# Patient Record
Sex: FEMALE | Race: WHITE | NOT HISPANIC OR LATINO | Employment: OTHER | URBAN - METROPOLITAN AREA
[De-identification: names, ages, dates, MRNs, and addresses within clinical notes are randomized per-mention and may not be internally consistent; named-entity substitution may affect disease eponyms.]

---

## 2019-07-17 RX ORDER — LISINOPRIL 5 MG/1
TABLET ORAL
Qty: 30 TABLET | Refills: 5 | Status: SHIPPED | OUTPATIENT
Start: 2019-07-17 | End: 2020-01-12

## 2019-08-26 ENCOUNTER — OFFICE VISIT (OUTPATIENT)
Dept: FAMILY MEDICINE CLINIC | Facility: CLINIC | Age: 64
End: 2019-08-26

## 2019-08-26 VITALS
HEART RATE: 69 BPM | WEIGHT: 158 LBS | DIASTOLIC BLOOD PRESSURE: 82 MMHG | TEMPERATURE: 97 F | OXYGEN SATURATION: 100 % | SYSTOLIC BLOOD PRESSURE: 143 MMHG

## 2019-08-26 DIAGNOSIS — M19.90 ARTHRITIS: Primary | ICD-10-CM

## 2019-08-26 PROBLEM — K21.9 GASTROESOPHAGEAL REFLUX DISEASE: Status: ACTIVE | Noted: 2017-04-17

## 2019-08-26 PROBLEM — R73.01 FASTING HYPERGLYCEMIA: Status: ACTIVE | Noted: 2017-05-19

## 2019-08-26 PROCEDURE — 99213 OFFICE O/P EST LOW 20 MIN: CPT | Performed by: FAMILY MEDICINE

## 2019-08-26 RX ORDER — OMEPRAZOLE 20 MG/1
1 CAPSULE, DELAYED RELEASE ORAL DAILY PRN
COMMUNITY
Start: 2019-07-17 | End: 2020-07-17

## 2019-08-26 RX ORDER — ESTRADIOL 1 MG/1
1 TABLET ORAL DAILY
COMMUNITY
Start: 2016-10-04

## 2019-09-04 DIAGNOSIS — M19.90 ARTHRITIS: Primary | ICD-10-CM

## 2019-09-08 PROBLEM — M19.90 ARTHRITIS: Status: ACTIVE | Noted: 2019-09-08

## 2019-10-04 ENCOUNTER — OFFICE (AMBULATORY)
Dept: URBAN - METROPOLITAN AREA PATHOLOGY 4 | Facility: PATHOLOGY | Age: 64
End: 2019-10-04
Payer: COMMERCIAL

## 2019-10-04 ENCOUNTER — ON CAMPUS - OUTPATIENT (AMBULATORY)
Dept: URBAN - METROPOLITAN AREA HOSPITAL 2 | Facility: HOSPITAL | Age: 64
End: 2019-10-04
Payer: COMMERCIAL

## 2019-10-04 VITALS
DIASTOLIC BLOOD PRESSURE: 72 MMHG | WEIGHT: 155.4 LBS | SYSTOLIC BLOOD PRESSURE: 130 MMHG | RESPIRATION RATE: 12 BRPM | DIASTOLIC BLOOD PRESSURE: 76 MMHG | DIASTOLIC BLOOD PRESSURE: 63 MMHG | TEMPERATURE: 97.8 F | HEART RATE: 67 BPM | HEART RATE: 74 BPM | OXYGEN SATURATION: 97 % | HEIGHT: 64 IN | DIASTOLIC BLOOD PRESSURE: 86 MMHG | DIASTOLIC BLOOD PRESSURE: 77 MMHG | HEART RATE: 73 BPM | HEART RATE: 77 BPM | HEART RATE: 75 BPM | SYSTOLIC BLOOD PRESSURE: 101 MMHG | RESPIRATION RATE: 19 BRPM | SYSTOLIC BLOOD PRESSURE: 95 MMHG | HEART RATE: 80 BPM | OXYGEN SATURATION: 99 % | HEART RATE: 84 BPM | RESPIRATION RATE: 16 BRPM | OXYGEN SATURATION: 98 % | SYSTOLIC BLOOD PRESSURE: 121 MMHG | DIASTOLIC BLOOD PRESSURE: 78 MMHG | RESPIRATION RATE: 17 BRPM | HEART RATE: 66 BPM | SYSTOLIC BLOOD PRESSURE: 117 MMHG | HEART RATE: 81 BPM | DIASTOLIC BLOOD PRESSURE: 85 MMHG | SYSTOLIC BLOOD PRESSURE: 137 MMHG | SYSTOLIC BLOOD PRESSURE: 126 MMHG | DIASTOLIC BLOOD PRESSURE: 65 MMHG | SYSTOLIC BLOOD PRESSURE: 147 MMHG | DIASTOLIC BLOOD PRESSURE: 71 MMHG | DIASTOLIC BLOOD PRESSURE: 64 MMHG | OXYGEN SATURATION: 100 %

## 2019-10-04 DIAGNOSIS — D12.2 BENIGN NEOPLASM OF ASCENDING COLON: ICD-10-CM

## 2019-10-04 DIAGNOSIS — K63.5 POLYP OF COLON: ICD-10-CM

## 2019-10-04 DIAGNOSIS — Z12.11 ENCOUNTER FOR SCREENING FOR MALIGNANT NEOPLASM OF COLON: ICD-10-CM

## 2019-10-04 DIAGNOSIS — K57.30 DIVERTICULOSIS OF LARGE INTESTINE WITHOUT PERFORATION OR ABS: ICD-10-CM

## 2019-10-04 DIAGNOSIS — D12.3 BENIGN NEOPLASM OF TRANSVERSE COLON: ICD-10-CM

## 2019-10-04 DIAGNOSIS — K64.8 OTHER HEMORRHOIDS: ICD-10-CM

## 2019-10-04 PROBLEM — D12.4 BENIGN NEOPLASM OF DESCENDING COLON: Status: ACTIVE | Noted: 2019-10-04

## 2019-10-04 PROBLEM — D12.5 BENIGN NEOPLASM OF SIGMOID COLON: Status: ACTIVE | Noted: 2019-10-04

## 2019-10-04 LAB
GI HISTOLOGY: A. UNSPECIFIED: (no result)
GI HISTOLOGY: B. UNSPECIFIED: (no result)
GI HISTOLOGY: C. UNSPECIFIED: (no result)
GI HISTOLOGY: D. UNSPECIFIED: (no result)
GI HISTOLOGY: PDF REPORT: (no result)

## 2019-10-04 PROCEDURE — 45380 COLONOSCOPY AND BIOPSY: CPT | Mod: 33,59 | Performed by: INTERNAL MEDICINE

## 2019-10-04 PROCEDURE — 45385 COLONOSCOPY W/LESION REMOVAL: CPT | Mod: 33 | Performed by: INTERNAL MEDICINE

## 2019-10-04 PROCEDURE — 88305 TISSUE EXAM BY PATHOLOGIST: CPT | Mod: 33 | Performed by: INTERNAL MEDICINE

## 2020-01-10 ENCOUNTER — OFFICE VISIT (OUTPATIENT)
Dept: FAMILY MEDICINE CLINIC | Facility: CLINIC | Age: 65
End: 2020-01-10

## 2020-01-10 VITALS
DIASTOLIC BLOOD PRESSURE: 85 MMHG | TEMPERATURE: 97.8 F | WEIGHT: 167 LBS | SYSTOLIC BLOOD PRESSURE: 145 MMHG | OXYGEN SATURATION: 98 % | HEART RATE: 71 BPM

## 2020-01-10 DIAGNOSIS — G89.29 CHRONIC RIGHT SHOULDER PAIN: Primary | ICD-10-CM

## 2020-01-10 DIAGNOSIS — M25.511 CHRONIC RIGHT SHOULDER PAIN: Primary | ICD-10-CM

## 2020-01-10 DIAGNOSIS — E66.09 CLASS 1 OBESITY DUE TO EXCESS CALORIES WITHOUT SERIOUS COMORBIDITY IN ADULT, UNSPECIFIED BMI: ICD-10-CM

## 2020-01-10 PROBLEM — E66.811 CLASS 1 OBESITY DUE TO EXCESS CALORIES WITHOUT SERIOUS COMORBIDITY IN ADULT: Status: ACTIVE | Noted: 2020-01-10

## 2020-01-10 PROCEDURE — 99213 OFFICE O/P EST LOW 20 MIN: CPT | Performed by: FAMILY MEDICINE

## 2020-01-10 RX ORDER — MELOXICAM 15 MG/1
TABLET ORAL
COMMUNITY
Start: 2019-12-18 | End: 2020-07-17

## 2020-01-10 RX ORDER — PHENTERMINE HYDROCHLORIDE 37.5 MG/1
37.5 CAPSULE ORAL EVERY MORNING
Qty: 90 CAPSULE | Refills: 0 | Status: SHIPPED | OUTPATIENT
Start: 2020-01-10 | End: 2020-07-17 | Stop reason: SDUPTHER

## 2020-01-10 NOTE — PROGRESS NOTES
Subjective   Chief Complaint   Patient presents with   • Shoulder Pain   • Neck Pain   • Abnormal Weight Gain     Anjelica Portillo is a 64 y.o. female.     Neck Pain    This is a new problem. The current episode started more than 1 month ago. The problem occurs constantly. The problem has been unchanged. The pain is associated with nothing. The pain is present in the right side. The quality of the pain is described as aching. The pain is moderate. The symptoms are aggravated by position. Pertinent negatives include no chest pain, fever, numbness or tingling.   Shoulder Injury    The right shoulder is affected. The incident occurred more than 1 week ago. There was no injury mechanism. The quality of the pain is described as aching. The pain radiates to the right neck. The pain is moderate. Pertinent negatives include no chest pain, numbness or tingling. The symptoms are aggravated by movement. She has tried rest for the symptoms. The treatment provided no relief.      Past Medical History:   Diagnosis Date   • GERD (gastroesophageal reflux disease)    • Hypertension      History reviewed. No pertinent surgical history.  No Known Allergies  Social History     Socioeconomic History   • Marital status: Unknown     Spouse name: Not on file   • Number of children: Not on file   • Years of education: Not on file   • Highest education level: Not on file   Tobacco Use   • Smoking status: Never Smoker   • Smokeless tobacco: Never Used   Substance and Sexual Activity   • Alcohol use: Not Currently     Comment: caffiene free     Social History     Tobacco Use   Smoking Status Never Smoker   Smokeless Tobacco Never Used       family history includes Arthritis in her father and paternal grandmother.  Current Outpatient Medications on File Prior to Visit   Medication Sig Dispense Refill   • meloxicam (MOBIC) 15 MG tablet      • estradiol (ESTRACE) 1 MG tablet Take 1 tablet by mouth Daily.     • Lifitegrast 5 % solution      •  omeprazole (priLOSEC) 20 MG capsule Take 1 capsule by mouth Daily As Needed.       No current facility-administered medications on file prior to visit.      Patient Active Problem List   Diagnosis   • Gastroesophageal reflux disease   • Hypertension   • Renal colic   • Fatigue   • Fasting hyperglycemia   • Arthritis   • Chronic right shoulder pain   • Class 1 obesity due to excess calories without serious comorbidity in adult       The following portions of the patient's history were reviewed and updated as appropriate: allergies, current medications, past family history, past medical history, past social history, past surgical history and problem list.    Review of Systems   Constitutional: Negative for chills and fever.   HENT: Negative for sinus pressure and sore throat.    Eyes: Negative for blurred vision.   Respiratory: Negative for cough and shortness of breath.    Cardiovascular: Negative for chest pain and palpitations.   Gastrointestinal: Negative for abdominal pain.   Endocrine: Negative for polyuria.   Musculoskeletal: Positive for neck pain.   Skin: Negative for rash.   Neurological: Negative for dizziness, tingling, numbness and headache.   Hematological: Negative for adenopathy.   Psychiatric/Behavioral: Negative for depressed mood.       Objective   /85 (BP Location: Left arm, Patient Position: Sitting, Cuff Size: Adult)   Pulse 71   Temp 97.8 °F (36.6 °C) (Oral)   Wt 75.8 kg (167 lb)   SpO2 98%   Physical Exam   Constitutional: She is oriented to person, place, and time. She appears well-developed. No distress.   HENT:   Head: Normocephalic.   Eyes: Conjunctivae and lids are normal.   Neck: Trachea normal. No thyroid mass and no thyromegaly present.   Cardiovascular: Normal rate, regular rhythm and normal heart sounds.   Pulmonary/Chest: Effort normal and breath sounds normal.   Musculoskeletal:        Right shoulder: She exhibits decreased range of motion and tenderness.    Lymphadenopathy:     She has no cervical adenopathy.   Neurological: She is alert and oriented to person, place, and time.   Skin: Skin is warm and dry.   Psychiatric: She has a normal mood and affect. Her speech is normal and behavior is normal. She is attentive.       No visits with results within 1 Week(s) from this visit.   Latest known visit with results is:   No results found for any previous visit.           Assessment/Plan   Problems Addressed this Visit        Digestive    Class 1 obesity due to excess calories without serious comorbidity in adult    Relevant Medications    phentermine 37.5 MG capsule       Nervous and Auditory    Chronic right shoulder pain - Primary     Pt will schedule an appt with Dr. Patrick.               Anjelica was seen today for shoulder pain, neck pain and abnormal weight gain.    Diagnoses and all orders for this visit:    Chronic right shoulder pain    Class 1 obesity due to excess calories without serious comorbidity in adult, unspecified BMI  -     phentermine 37.5 MG capsule; Take 1 capsule by mouth Every Morning.

## 2020-01-12 RX ORDER — LISINOPRIL 5 MG/1
TABLET ORAL
Qty: 30 TABLET | Refills: 6 | Status: SHIPPED | OUTPATIENT
Start: 2020-01-12 | End: 2020-07-17 | Stop reason: SDUPTHER

## 2020-04-27 ENCOUNTER — TELEPHONE (OUTPATIENT)
Dept: FAMILY MEDICINE CLINIC | Facility: CLINIC | Age: 65
End: 2020-04-27

## 2020-04-27 RX ORDER — SULFAMETHOXAZOLE AND TRIMETHOPRIM 800; 160 MG/1; MG/1
1 TABLET ORAL 2 TIMES DAILY
Qty: 14 TABLET | Refills: 0 | Status: SHIPPED | OUTPATIENT
Start: 2020-04-27 | End: 2020-07-17

## 2020-07-17 ENCOUNTER — OFFICE VISIT (OUTPATIENT)
Dept: FAMILY MEDICINE CLINIC | Facility: CLINIC | Age: 65
End: 2020-07-17

## 2020-07-17 VITALS
TEMPERATURE: 98.8 F | DIASTOLIC BLOOD PRESSURE: 83 MMHG | BODY MASS INDEX: 27.64 KG/M2 | HEART RATE: 73 BPM | SYSTOLIC BLOOD PRESSURE: 144 MMHG | OXYGEN SATURATION: 98 % | WEIGHT: 156 LBS | HEIGHT: 63 IN

## 2020-07-17 DIAGNOSIS — Z00.00 WELLNESS EXAMINATION: Primary | ICD-10-CM

## 2020-07-17 DIAGNOSIS — H61.22 IMPACTED CERUMEN OF LEFT EAR: ICD-10-CM

## 2020-07-17 DIAGNOSIS — Z12.31 ENCOUNTER FOR SCREENING MAMMOGRAM FOR BREAST CANCER: ICD-10-CM

## 2020-07-17 DIAGNOSIS — E66.09 CLASS 1 OBESITY DUE TO EXCESS CALORIES WITHOUT SERIOUS COMORBIDITY IN ADULT, UNSPECIFIED BMI: ICD-10-CM

## 2020-07-17 PROCEDURE — 99396 PREV VISIT EST AGE 40-64: CPT | Performed by: FAMILY MEDICINE

## 2020-07-17 PROCEDURE — 69209 REMOVE IMPACTED EAR WAX UNI: CPT | Performed by: FAMILY MEDICINE

## 2020-07-17 RX ORDER — LISINOPRIL 5 MG/1
5 TABLET ORAL DAILY
Qty: 30 TABLET | Refills: 6 | Status: SHIPPED | OUTPATIENT
Start: 2020-07-17 | End: 2021-03-11

## 2020-07-17 RX ORDER — PHENTERMINE HYDROCHLORIDE 37.5 MG/1
37.5 CAPSULE ORAL EVERY MORNING
Qty: 90 CAPSULE | Refills: 0 | Status: SHIPPED | OUTPATIENT
Start: 2020-07-17 | End: 2022-01-21 | Stop reason: SDUPTHER

## 2020-07-17 NOTE — PROGRESS NOTES
"Shefali Portillo is a 65 y.o. female and is here for a comprehensive physical exam. The patient reports no problems.    Do you take any herbs or supplements that were not prescribed by a doctor? no  Are you taking calcium supplements? no  Are you taking aspirin daily? no    The following portions of the patient's history were reviewed and updated as appropriate: allergies, current medications, past family history, past medical history, past social history, past surgical history and problem list.    Review of Systems  Do you have pain that bothers you in your daily life? yes right elbow  A comprehensive review of systems was negative.    Objective   /83 (BP Location: Left arm, Patient Position: Sitting, Cuff Size: Adult)   Pulse 73   Temp 98.8 °F (37.1 °C)   Ht 160 cm (63\")   Wt 70.8 kg (156 lb)   SpO2 98%   BMI 27.63 kg/m²   General appearance: alert, appears stated age and cooperative  Head: Normocephalic, without obvious abnormality, atraumatic  Eyes: conjunctivae/corneas clear. PERRL, EOM's intact. Fundi benign.  Ears: abnormal external canal left ear - cerumen removed by irrigation  Neck: no adenopathy, supple, symmetrical, trachea midline and thyroid not enlarged, symmetric, no tenderness/mass/nodules  Lungs: clear to auscultation bilaterally  Heart: regular rate and rhythm, S1, S2 normal, no murmur, click, rub or gallop  Abdomen: soft, non-tender; bowel sounds normal; no masses,  no organomegaly  Extremities: extremities normal, atraumatic, no cyanosis or edema  Skin: Skin color, texture, turgor normal. No rashes or lesions  Lymph nodes: Cervical, supraclavicular, and axillary nodes normal.  Neurologic: Grossly normal     No visits with results within 1 Week(s) from this visit.   Latest known visit with results is:   No results found for any previous visit.     Ear Cerumen Removal  Date/Time: 7/17/2020 4:20 PM  Performed by: Manjula Vergara MD  Authorized by: Manjula Vergara MD "     Anesthesia:  Local Anesthetic: none  Location details: left ear  Patient tolerance: Patient tolerated the procedure well with no immediate complications  Procedure type: irrigation   Sedation:  Patient sedated: no            Assessment/Plan   Healthy female exam.  There are no diagnoses linked to this encounter.  1. Wellness exam  2. Patient Counseling:  --Nutrition: Stressed importance of moderation in sodium/caffeine intake, saturated fat and cholesterol, caloric balance, sufficient intake of fresh fruits, vegetables, fiber, calcium, iron, and 1 mg of folate supplement per day (for females capable of pregnancy).  --Exercise: Stressed the importance of regular exercise.   --Dental health: Discussed importance of regular tooth brushing, flossing, and dental visits.  --Discussed benefits of screening colonoscopy.    3. Discussed the patient's BMI with her.  The BMI is above average; BMI management plan is completed  4. Follow up in one year

## 2020-07-30 ENCOUNTER — LAB (OUTPATIENT)
Dept: FAMILY MEDICINE CLINIC | Facility: CLINIC | Age: 65
End: 2020-07-30

## 2020-07-30 LAB
ALBUMIN SERPL-MCNC: 4.3 G/DL (ref 3.5–5.2)
ALBUMIN/GLOB SERPL: 1.7 G/DL
ALP SERPL-CCNC: 74 U/L (ref 39–117)
ALT SERPL W P-5'-P-CCNC: 18 U/L (ref 1–33)
ANION GAP SERPL CALCULATED.3IONS-SCNC: 12.3 MMOL/L (ref 5–15)
AST SERPL-CCNC: 20 U/L (ref 1–32)
BILIRUB SERPL-MCNC: 0.7 MG/DL (ref 0–1.2)
BUN SERPL-MCNC: 22 MG/DL (ref 8–23)
BUN/CREAT SERPL: 19.3 (ref 7–25)
CALCIUM SPEC-SCNC: 9.1 MG/DL (ref 8.6–10.5)
CHLORIDE SERPL-SCNC: 100 MMOL/L (ref 98–107)
CHOLEST SERPL-MCNC: 168 MG/DL (ref 0–200)
CO2 SERPL-SCNC: 22.7 MMOL/L (ref 22–29)
CREAT SERPL-MCNC: 1.14 MG/DL (ref 0.57–1)
GFR SERPL CREATININE-BSD FRML MDRD: 48 ML/MIN/1.73
GLOBULIN UR ELPH-MCNC: 2.5 GM/DL
GLUCOSE SERPL-MCNC: 85 MG/DL (ref 65–99)
HDLC SERPL-MCNC: 59 MG/DL (ref 40–60)
LDLC SERPL CALC-MCNC: 92 MG/DL (ref 0–100)
LDLC/HDLC SERPL: 1.56 {RATIO}
POTASSIUM SERPL-SCNC: 4.1 MMOL/L (ref 3.5–5.2)
PROT SERPL-MCNC: 6.8 G/DL (ref 6–8.5)
SODIUM SERPL-SCNC: 135 MMOL/L (ref 136–145)
TRIGL SERPL-MCNC: 85 MG/DL (ref 0–150)
VLDLC SERPL-MCNC: 17 MG/DL (ref 5–40)

## 2020-07-30 PROCEDURE — 80061 LIPID PANEL: CPT | Performed by: FAMILY MEDICINE

## 2020-07-30 PROCEDURE — 36415 COLL VENOUS BLD VENIPUNCTURE: CPT | Performed by: FAMILY MEDICINE

## 2020-07-30 PROCEDURE — 80053 COMPREHEN METABOLIC PANEL: CPT | Performed by: FAMILY MEDICINE

## 2020-08-17 RX ORDER — ESOMEPRAZOLE MAGNESIUM 40 MG/1
CAPSULE, DELAYED RELEASE ORAL
Qty: 30 CAPSULE | Refills: 5 | Status: SHIPPED | OUTPATIENT
Start: 2020-08-17 | End: 2021-02-12

## 2021-02-12 RX ORDER — ESOMEPRAZOLE MAGNESIUM 40 MG/1
CAPSULE, DELAYED RELEASE ORAL
Qty: 30 CAPSULE | Refills: 4 | Status: SHIPPED | OUTPATIENT
Start: 2021-02-12 | End: 2022-05-30

## 2021-03-11 RX ORDER — LISINOPRIL 5 MG/1
TABLET ORAL
Qty: 30 TABLET | Refills: 5 | Status: SHIPPED | OUTPATIENT
Start: 2021-03-11 | End: 2021-10-27

## 2021-10-27 RX ORDER — LISINOPRIL 5 MG/1
TABLET ORAL
Qty: 30 TABLET | Refills: 5 | Status: SHIPPED | OUTPATIENT
Start: 2021-10-27 | End: 2022-01-21 | Stop reason: SDUPTHER

## 2022-01-21 ENCOUNTER — LAB (OUTPATIENT)
Dept: FAMILY MEDICINE CLINIC | Facility: CLINIC | Age: 67
End: 2022-01-21

## 2022-01-21 ENCOUNTER — OFFICE VISIT (OUTPATIENT)
Dept: FAMILY MEDICINE CLINIC | Facility: CLINIC | Age: 67
End: 2022-01-21

## 2022-01-21 VITALS
WEIGHT: 168 LBS | SYSTOLIC BLOOD PRESSURE: 154 MMHG | BODY MASS INDEX: 29.76 KG/M2 | OXYGEN SATURATION: 98 % | HEART RATE: 76 BPM | TEMPERATURE: 98.4 F | DIASTOLIC BLOOD PRESSURE: 89 MMHG

## 2022-01-21 DIAGNOSIS — Z23 NEED FOR VACCINATION: ICD-10-CM

## 2022-01-21 DIAGNOSIS — Z00.00 WELLNESS EXAMINATION: Primary | ICD-10-CM

## 2022-01-21 DIAGNOSIS — E66.09 CLASS 1 OBESITY DUE TO EXCESS CALORIES WITHOUT SERIOUS COMORBIDITY IN ADULT, UNSPECIFIED BMI: ICD-10-CM

## 2022-01-21 LAB — HBA1C MFR BLD: 5.5 % (ref 3.5–5.6)

## 2022-01-21 PROCEDURE — 90662 IIV NO PRSV INCREASED AG IM: CPT | Performed by: FAMILY MEDICINE

## 2022-01-21 PROCEDURE — 85025 COMPLETE CBC W/AUTO DIFF WBC: CPT | Performed by: FAMILY MEDICINE

## 2022-01-21 PROCEDURE — 83036 HEMOGLOBIN GLYCOSYLATED A1C: CPT | Performed by: FAMILY MEDICINE

## 2022-01-21 PROCEDURE — 99397 PER PM REEVAL EST PAT 65+ YR: CPT | Performed by: FAMILY MEDICINE

## 2022-01-21 PROCEDURE — 80053 COMPREHEN METABOLIC PANEL: CPT | Performed by: FAMILY MEDICINE

## 2022-01-21 PROCEDURE — 90471 IMMUNIZATION ADMIN: CPT | Performed by: FAMILY MEDICINE

## 2022-01-21 PROCEDURE — 80061 LIPID PANEL: CPT | Performed by: FAMILY MEDICINE

## 2022-01-21 PROCEDURE — 36415 COLL VENOUS BLD VENIPUNCTURE: CPT | Performed by: FAMILY MEDICINE

## 2022-01-21 RX ORDER — PROMETHAZINE HYDROCHLORIDE 25 MG/1
25 TABLET ORAL EVERY 6 HOURS PRN
Qty: 20 TABLET | Refills: 0 | Status: SHIPPED | OUTPATIENT
Start: 2022-01-21 | End: 2023-03-22 | Stop reason: SDUPTHER

## 2022-01-21 RX ORDER — SULFAMETHOXAZOLE AND TRIMETHOPRIM 800; 160 MG/1; MG/1
1 TABLET ORAL 2 TIMES DAILY
Qty: 14 TABLET | Refills: 1 | Status: SHIPPED | OUTPATIENT
Start: 2022-01-21 | End: 2023-03-22

## 2022-01-21 RX ORDER — NAPROXEN SODIUM 220 MG
1 TABLET ORAL EVERY 24 HOURS
COMMUNITY

## 2022-01-21 RX ORDER — DOXYCYCLINE HYCLATE 100 MG
1 TABLET ORAL DAILY
COMMUNITY
End: 2023-03-22

## 2022-01-21 RX ORDER — BROMFENAC SODIUM 0.7 MG/ML
SOLUTION/ DROPS OPHTHALMIC
COMMUNITY
Start: 2021-11-19

## 2022-01-21 RX ORDER — BRIMONIDINE TARTRATE/TIMOLOL 0.2%-0.5%
DROPS OPHTHALMIC (EYE)
COMMUNITY
Start: 2021-12-08

## 2022-01-21 RX ORDER — PHENTERMINE HYDROCHLORIDE 37.5 MG/1
37.5 CAPSULE ORAL EVERY MORNING
Qty: 90 CAPSULE | Refills: 0 | Status: SHIPPED | OUTPATIENT
Start: 2022-01-21 | End: 2023-03-22 | Stop reason: SDUPTHER

## 2022-01-21 RX ORDER — PREDNISOLONE ACETATE 10 MG/ML
SUSPENSION/ DROPS OPHTHALMIC
COMMUNITY
Start: 2021-11-13

## 2022-01-21 RX ORDER — LISINOPRIL 5 MG/1
5 TABLET ORAL DAILY
Qty: 90 TABLET | Refills: 1 | Status: SHIPPED | OUTPATIENT
Start: 2022-01-21 | End: 2022-11-07

## 2022-01-21 NOTE — PROGRESS NOTES
Subjective   Anjelica Portillo is a 66 y.o. female and is here for a comprehensive physical exam. The patient reports no problems. She is getting ready to build a home.    Do you take any herbs or supplements that were not prescribed by a doctor? no  Are you taking calcium supplements? no  Are you taking aspirin daily? no    The following portions of the patient's history were reviewed and updated as appropriate: allergies, current medications, past family history, past medical history, past social history, past surgical history and problem list.    Review of Systems  Do you have pain that bothers you in your daily life? no  Pertinent items are noted in HPI.    Objective   /89 (BP Location: Left arm, Patient Position: Sitting, Cuff Size: Adult)   Pulse 76   Temp 98.4 °F (36.9 °C) (Infrared)   Wt 76.2 kg (168 lb)   SpO2 98%   BMI 29.76 kg/m²   General appearance: alert, appears stated age and cooperative  Head: Normocephalic, without obvious abnormality, atraumatic  Eyes: conjunctivae/corneas clear. PERRL, EOM's intact. Fundi benign.  Ears: normal TM's and external ear canals both ears  Throat: lips, mucosa, and tongue normal; teeth and gums normal  Neck: no adenopathy, supple, symmetrical, trachea midline and thyroid not enlarged, symmetric, no tenderness/mass/nodules  Lungs: clear to auscultation bilaterally  Heart: regular rate and rhythm, S1, S2 normal, no murmur, click, rub or gallop  Extremities: extremities normal, atraumatic, no cyanosis or edema  Skin: Skin color, texture, turgor normal. No rashes or lesions  Lymph nodes: Cervical, supraclavicular, and axillary nodes normal.  Neurologic: Grossly normal     Office Visit on 01/21/2022   Component Date Value Ref Range Status   • Glucose 01/21/2022 86  65 - 99 mg/dL Final   • BUN 01/21/2022 17  8 - 23 mg/dL Final   • Creatinine 01/21/2022 0.84  0.57 - 1.00 mg/dL Final   • Sodium 01/21/2022 139  136 - 145 mmol/L Final   • Potassium 01/21/2022 4.3  3.5 -  5.2 mmol/L Final   • Chloride 01/21/2022 104  98 - 107 mmol/L Final   • CO2 01/21/2022 24.1  22.0 - 29.0 mmol/L Final   • Calcium 01/21/2022 8.9  8.6 - 10.5 mg/dL Final   • Total Protein 01/21/2022 6.9  6.0 - 8.5 g/dL Final   • Albumin 01/21/2022 4.50  3.50 - 5.20 g/dL Final   • ALT (SGPT) 01/21/2022 19  1 - 33 U/L Final   • AST (SGOT) 01/21/2022 21  1 - 32 U/L Final   • Alkaline Phosphatase 01/21/2022 106  39 - 117 U/L Final   • Total Bilirubin 01/21/2022 <0.2  0.0 - 1.2 mg/dL Final   • eGFR Non  Amer 01/21/2022 68  >60 mL/min/1.73 Final   • Globulin 01/21/2022 2.4  gm/dL Final   • A/G Ratio 01/21/2022 1.9  g/dL Final   • BUN/Creatinine Ratio 01/21/2022 20.2  7.0 - 25.0 Final   • Anion Gap 01/21/2022 10.9  5.0 - 15.0 mmol/L Final   • Hemoglobin A1C 01/21/2022 5.5  3.5 - 5.6 % Final   • Total Cholesterol 01/21/2022 192  0 - 200 mg/dL Final   • Triglycerides 01/21/2022 99  0 - 150 mg/dL Final   • HDL Cholesterol 01/21/2022 71* 40 - 60 mg/dL Final   • LDL Cholesterol  01/21/2022 103* 0 - 100 mg/dL Final   • VLDL Cholesterol 01/21/2022 18  5 - 40 mg/dL Final   • LDL/HDL Ratio 01/21/2022 1.43   Final   • WBC 01/21/2022 8.01  3.40 - 10.80 10*3/mm3 Final   • RBC 01/21/2022 4.67  3.77 - 5.28 10*6/mm3 Final   • Hemoglobin 01/21/2022 13.8  12.0 - 15.9 g/dL Final   • Hematocrit 01/21/2022 41.6  34.0 - 46.6 % Final   • MCV 01/21/2022 89.1  79.0 - 97.0 fL Final   • MCH 01/21/2022 29.6  26.6 - 33.0 pg Final   • MCHC 01/21/2022 33.2  31.5 - 35.7 g/dL Final   • RDW 01/21/2022 12.1* 12.3 - 15.4 % Final   • RDW-SD 01/21/2022 39.3  37.0 - 54.0 fl Final   • MPV 01/21/2022 11.7  6.0 - 12.0 fL Final   • Platelets 01/21/2022 268  140 - 450 10*3/mm3 Final   • Neutrophil % 01/21/2022 59.6  42.7 - 76.0 % Final   • Lymphocyte % 01/21/2022 28.3  19.6 - 45.3 % Final   • Monocyte % 01/21/2022 8.7  5.0 - 12.0 % Final   • Eosinophil % 01/21/2022 1.9  0.3 - 6.2 % Final   • Basophil % 01/21/2022 1.0  0.0 - 1.5 % Final   • Immature Grans %  01/21/2022 0.5  0.0 - 0.5 % Final   • Neutrophils, Absolute 01/21/2022 4.77  1.70 - 7.00 10*3/mm3 Final   • Lymphocytes, Absolute 01/21/2022 2.27  0.70 - 3.10 10*3/mm3 Final   • Monocytes, Absolute 01/21/2022 0.70  0.10 - 0.90 10*3/mm3 Final   • Eosinophils, Absolute 01/21/2022 0.15  0.00 - 0.40 10*3/mm3 Final   • Basophils, Absolute 01/21/2022 0.08  0.00 - 0.20 10*3/mm3 Final   • Immature Grans, Absolute 01/21/2022 0.04  0.00 - 0.05 10*3/mm3 Final   • nRBC 01/21/2022 0.0  0.0 - 0.2 /100 WBC Final       Assessment/Plan   Healthy female exam.   Diagnoses and all orders for this visit:    1. Wellness examination (Primary)  -     Comprehensive Metabolic Panel  -     Hemoglobin A1c  -     Lipid Panel  -     CBC & Differential    2. Class 1 obesity due to excess calories without serious comorbidity in adult, unspecified BMI  -     phentermine 37.5 MG capsule; Take 1 capsule by mouth Every Morning.  Dispense: 90 capsule; Refill: 0    3. Need for vaccination  -     Fluzone High-Dose 65+yrs ([id number])    Other orders  -     lisinopril (PRINIVIL,ZESTRIL) 5 MG tablet; Take 1 tablet by mouth Daily.  Dispense: 90 tablet; Refill: 1  -     sulfamethoxazole-trimethoprim (Bactrim DS) 800-160 MG per tablet; Take 1 tablet by mouth 2 (Two) Times a Day.  Dispense: 14 tablet; Refill: 1  -     promethazine (PHENERGAN) 25 MG tablet; Take 1 tablet by mouth Every 6 (Six) Hours As Needed for Nausea or Vomiting.  Dispense: 20 tablet; Refill: 0      1. Well exam.  2. Patient Counseling:  --Nutrition: Stressed importance of moderation in sodium/caffeine intake, saturated fat and cholesterol, caloric balance, sufficient intake of fresh fruits, vegetables, fiber, calcium, iron,.  --Exercise: Stressed the importance of regular exercise.    --Injury prevention: Discussed safety belts, safety helmets, smoke detector, smoking near bedding or upholstery.   --Dental health: Discussed importance of regular tooth brushing, flossing, and dental  visits.  --Immunizations reviewed.  --Discussed benefits of screening colonoscopy.    3. Discussed the patient's BMI with her.  The BMI is above average; BMI management plan is completed  4. Follow up in one year

## 2022-01-22 LAB
ALBUMIN SERPL-MCNC: 4.5 G/DL (ref 3.5–5.2)
ALBUMIN/GLOB SERPL: 1.9 G/DL
ALP SERPL-CCNC: 106 U/L (ref 39–117)
ALT SERPL W P-5'-P-CCNC: 19 U/L (ref 1–33)
ANION GAP SERPL CALCULATED.3IONS-SCNC: 10.9 MMOL/L (ref 5–15)
AST SERPL-CCNC: 21 U/L (ref 1–32)
BASOPHILS # BLD AUTO: 0.08 10*3/MM3 (ref 0–0.2)
BASOPHILS NFR BLD AUTO: 1 % (ref 0–1.5)
BILIRUB SERPL-MCNC: <0.2 MG/DL (ref 0–1.2)
BUN SERPL-MCNC: 17 MG/DL (ref 8–23)
BUN/CREAT SERPL: 20.2 (ref 7–25)
CALCIUM SPEC-SCNC: 8.9 MG/DL (ref 8.6–10.5)
CHLORIDE SERPL-SCNC: 104 MMOL/L (ref 98–107)
CHOLEST SERPL-MCNC: 192 MG/DL (ref 0–200)
CO2 SERPL-SCNC: 24.1 MMOL/L (ref 22–29)
CREAT SERPL-MCNC: 0.84 MG/DL (ref 0.57–1)
DEPRECATED RDW RBC AUTO: 39.3 FL (ref 37–54)
EOSINOPHIL # BLD AUTO: 0.15 10*3/MM3 (ref 0–0.4)
EOSINOPHIL NFR BLD AUTO: 1.9 % (ref 0.3–6.2)
ERYTHROCYTE [DISTWIDTH] IN BLOOD BY AUTOMATED COUNT: 12.1 % (ref 12.3–15.4)
GFR SERPL CREATININE-BSD FRML MDRD: 68 ML/MIN/1.73
GLOBULIN UR ELPH-MCNC: 2.4 GM/DL
GLUCOSE SERPL-MCNC: 86 MG/DL (ref 65–99)
HCT VFR BLD AUTO: 41.6 % (ref 34–46.6)
HDLC SERPL-MCNC: 71 MG/DL (ref 40–60)
HGB BLD-MCNC: 13.8 G/DL (ref 12–15.9)
IMM GRANULOCYTES # BLD AUTO: 0.04 10*3/MM3 (ref 0–0.05)
IMM GRANULOCYTES NFR BLD AUTO: 0.5 % (ref 0–0.5)
LDLC SERPL CALC-MCNC: 103 MG/DL (ref 0–100)
LDLC/HDLC SERPL: 1.43 {RATIO}
LYMPHOCYTES # BLD AUTO: 2.27 10*3/MM3 (ref 0.7–3.1)
LYMPHOCYTES NFR BLD AUTO: 28.3 % (ref 19.6–45.3)
MCH RBC QN AUTO: 29.6 PG (ref 26.6–33)
MCHC RBC AUTO-ENTMCNC: 33.2 G/DL (ref 31.5–35.7)
MCV RBC AUTO: 89.1 FL (ref 79–97)
MONOCYTES # BLD AUTO: 0.7 10*3/MM3 (ref 0.1–0.9)
MONOCYTES NFR BLD AUTO: 8.7 % (ref 5–12)
NEUTROPHILS NFR BLD AUTO: 4.77 10*3/MM3 (ref 1.7–7)
NEUTROPHILS NFR BLD AUTO: 59.6 % (ref 42.7–76)
NRBC BLD AUTO-RTO: 0 /100 WBC (ref 0–0.2)
PLATELET # BLD AUTO: 268 10*3/MM3 (ref 140–450)
PMV BLD AUTO: 11.7 FL (ref 6–12)
POTASSIUM SERPL-SCNC: 4.3 MMOL/L (ref 3.5–5.2)
PROT SERPL-MCNC: 6.9 G/DL (ref 6–8.5)
RBC # BLD AUTO: 4.67 10*6/MM3 (ref 3.77–5.28)
SODIUM SERPL-SCNC: 139 MMOL/L (ref 136–145)
TRIGL SERPL-MCNC: 99 MG/DL (ref 0–150)
VLDLC SERPL-MCNC: 18 MG/DL (ref 5–40)
WBC NRBC COR # BLD: 8.01 10*3/MM3 (ref 3.4–10.8)

## 2022-01-24 NOTE — PROGRESS NOTES
Pt was notified and wanted labs mailed out to her so I printed them out for her and had them mailed

## 2022-02-23 ENCOUNTER — TELEPHONE (OUTPATIENT)
Dept: FAMILY MEDICINE CLINIC | Facility: CLINIC | Age: 67
End: 2022-02-23

## 2022-05-30 RX ORDER — ESOMEPRAZOLE MAGNESIUM 40 MG/1
CAPSULE, DELAYED RELEASE ORAL
Qty: 30 CAPSULE | Refills: 4 | Status: SHIPPED | OUTPATIENT
Start: 2022-05-30 | End: 2022-11-21

## 2022-11-07 RX ORDER — LISINOPRIL 5 MG/1
TABLET ORAL
Qty: 30 TABLET | Refills: 2 | Status: SHIPPED | OUTPATIENT
Start: 2022-11-07 | End: 2023-02-07

## 2022-11-21 RX ORDER — ESOMEPRAZOLE MAGNESIUM 40 MG/1
CAPSULE, DELAYED RELEASE ORAL
Qty: 30 CAPSULE | Refills: 4 | Status: SHIPPED | OUTPATIENT
Start: 2022-11-21

## 2023-02-07 RX ORDER — LISINOPRIL 5 MG/1
TABLET ORAL
Qty: 30 TABLET | Refills: 0 | Status: SHIPPED | OUTPATIENT
Start: 2023-02-07 | End: 2023-03-08

## 2023-03-08 RX ORDER — LISINOPRIL 5 MG/1
TABLET ORAL
Qty: 30 TABLET | Refills: 0 | Status: SHIPPED | OUTPATIENT
Start: 2023-03-08 | End: 2023-04-07

## 2023-03-22 ENCOUNTER — OFFICE VISIT (OUTPATIENT)
Dept: FAMILY MEDICINE CLINIC | Facility: CLINIC | Age: 68
End: 2023-03-22
Payer: COMMERCIAL

## 2023-03-22 VITALS
HEIGHT: 63 IN | SYSTOLIC BLOOD PRESSURE: 139 MMHG | WEIGHT: 169 LBS | HEART RATE: 74 BPM | BODY MASS INDEX: 29.95 KG/M2 | TEMPERATURE: 97.8 F | OXYGEN SATURATION: 97 % | DIASTOLIC BLOOD PRESSURE: 84 MMHG

## 2023-03-22 DIAGNOSIS — E66.09 CLASS 1 OBESITY DUE TO EXCESS CALORIES WITHOUT SERIOUS COMORBIDITY IN ADULT, UNSPECIFIED BMI: ICD-10-CM

## 2023-03-22 DIAGNOSIS — Z00.00 WELLNESS EXAMINATION: Primary | ICD-10-CM

## 2023-03-22 DIAGNOSIS — Z12.31 ENCOUNTER FOR SCREENING MAMMOGRAM FOR BREAST CANCER: ICD-10-CM

## 2023-03-22 RX ORDER — PROMETHAZINE HYDROCHLORIDE 25 MG/1
25 TABLET ORAL EVERY 6 HOURS PRN
Qty: 20 TABLET | Refills: 0 | Status: SHIPPED | OUTPATIENT
Start: 2023-03-22

## 2023-03-22 RX ORDER — SULFAMETHOXAZOLE AND TRIMETHOPRIM 800; 160 MG/1; MG/1
1 TABLET ORAL 2 TIMES DAILY
Qty: 14 TABLET | Refills: 1 | Status: SHIPPED | OUTPATIENT
Start: 2023-03-22

## 2023-03-22 RX ORDER — PHENTERMINE HYDROCHLORIDE 37.5 MG/1
37.5 CAPSULE ORAL EVERY MORNING
Qty: 90 CAPSULE | Refills: 0 | Status: SHIPPED | OUTPATIENT
Start: 2023-03-22

## 2023-03-22 NOTE — PROGRESS NOTES
"Shefali Portillo is a 67 y.o. female and is here for a comprehensive physical exam. The patient reports no problems.    Do you take any herbs or supplements that were not prescribed by a doctor? no  Are you taking calcium supplements? no  Are you taking aspirin daily? no    The following portions of the patient's history were reviewed and updated as appropriate: allergies, current medications, past family history, past medical history, past social history, past surgical history and problem list.    Review of Systems  Do you have pain that bothers you in your daily life? not asked  A comprehensive review of systems was negative.    Objective   /84 (BP Location: Left arm, Patient Position: Sitting, Cuff Size: Adult)   Pulse 74   Temp 97.8 °F (36.6 °C) (Infrared)   Ht 160 cm (63\")   Wt 76.7 kg (169 lb)   SpO2 97%   BMI 29.94 kg/m²   General appearance: alert, appears stated age and cooperative  Head: Normocephalic, without obvious abnormality, atraumatic  Eyes: conjunctivae/corneas clear. PERRL, EOM's intact. Fundi benign.  Ears: normal TM's and external ear canals both ears  Throat: lips, mucosa, and tongue normal; teeth and gums normal  Neck: no adenopathy, no JVD, supple, symmetrical, trachea midline and thyroid not enlarged, symmetric, no tenderness/mass/nodules  Lungs: clear to auscultation bilaterally  Heart: regular rate and rhythm, S1, S2 normal, no murmur, click, rub or gallop  Abdomen: soft, non-tender; bowel sounds normal; no masses,  no organomegaly  Extremities: extremities normal, atraumatic, no cyanosis or edema  Pulses: 2+ and symmetric  Skin: Skin color, texture, turgor normal. No rashes or lesions  Lymph nodes: Cervical, supraclavicular, and axillary nodes normal.  Neurologic: Grossly normal     Office Visit on 03/22/2023   Component Date Value Ref Range Status   • Glucose 03/27/2023 93  65 - 99 mg/dL Final   • BUN 03/27/2023 18  8 - 23 mg/dL Final   • Creatinine 03/27/2023 1.05 " (H)  0.57 - 1.00 mg/dL Final   • Sodium 03/27/2023 139  136 - 145 mmol/L Final   • Potassium 03/27/2023 4.2  3.5 - 5.2 mmol/L Final   • Chloride 03/27/2023 105  98 - 107 mmol/L Final   • CO2 03/27/2023 23.0  22.0 - 29.0 mmol/L Final   • Calcium 03/27/2023 8.8  8.6 - 10.5 mg/dL Final   • Total Protein 03/27/2023 6.9  6.0 - 8.5 g/dL Final   • Albumin 03/27/2023 4.1  3.5 - 5.2 g/dL Final   • ALT (SGPT) 03/27/2023 16  1 - 33 U/L Final   • AST (SGOT) 03/27/2023 21  1 - 32 U/L Final   • Alkaline Phosphatase 03/27/2023 88  39 - 117 U/L Final   • Total Bilirubin 03/27/2023 0.4  0.0 - 1.2 mg/dL Final   • Globulin 03/27/2023 2.8  gm/dL Final   • A/G Ratio 03/27/2023 1.5  g/dL Final   • BUN/Creatinine Ratio 03/27/2023 17.1  7.0 - 25.0 Final   • Anion Gap 03/27/2023 11.0  5.0 - 15.0 mmol/L Final   • eGFR 03/27/2023 58.7 (L)  >60.0 mL/min/1.73 Final   • Total Cholesterol 03/27/2023 202 (H)  0 - 200 mg/dL Final   • Triglycerides 03/27/2023 184 (H)  0 - 150 mg/dL Final   • HDL Cholesterol 03/27/2023 58  40 - 60 mg/dL Final   • LDL Cholesterol  03/27/2023 112 (H)  0 - 100 mg/dL Final   • VLDL Cholesterol 03/27/2023 32  5 - 40 mg/dL Final   • LDL/HDL Ratio 03/27/2023 1.85   Final   • Hemoglobin A1C 03/27/2023 5.00  4.80 - 5.60 % Final   • TSH 03/27/2023 1.780  0.270 - 4.200 uIU/mL Final   • WBC 03/27/2023 7.51  3.40 - 10.80 10*3/mm3 Final   • RBC 03/27/2023 4.63  3.77 - 5.28 10*6/mm3 Final   • Hemoglobin 03/27/2023 13.4  12.0 - 15.9 g/dL Final   • Hematocrit 03/27/2023 40.2  34.0 - 46.6 % Final   • MCV 03/27/2023 86.8  79.0 - 97.0 fL Final   • MCH 03/27/2023 28.9  26.6 - 33.0 pg Final   • MCHC 03/27/2023 33.3  31.5 - 35.7 g/dL Final   • RDW 03/27/2023 12.5  12.3 - 15.4 % Final   • RDW-SD 03/27/2023 39.7  37.0 - 54.0 fl Final   • MPV 03/27/2023 12.0  6.0 - 12.0 fL Final   • Platelets 03/27/2023 231  140 - 450 10*3/mm3 Final   • Neutrophil % 03/27/2023 61.0  42.7 - 76.0 % Final   • Lymphocyte % 03/27/2023 28.2  19.6 - 45.3 % Final   •  Monocyte % 03/27/2023 8.0  5.0 - 12.0 % Final   • Eosinophil % 03/27/2023 1.5  0.3 - 6.2 % Final   • Basophil % 03/27/2023 0.9  0.0 - 1.5 % Final   • Immature Grans % 03/27/2023 0.4  0.0 - 0.5 % Final   • Neutrophils, Absolute 03/27/2023 4.58  1.70 - 7.00 10*3/mm3 Final   • Lymphocytes, Absolute 03/27/2023 2.12  0.70 - 3.10 10*3/mm3 Final   • Monocytes, Absolute 03/27/2023 0.60  0.10 - 0.90 10*3/mm3 Final   • Eosinophils, Absolute 03/27/2023 0.11  0.00 - 0.40 10*3/mm3 Final   • Basophils, Absolute 03/27/2023 0.07  0.00 - 0.20 10*3/mm3 Final   • Immature Grans, Absolute 03/27/2023 0.03  0.00 - 0.05 10*3/mm3 Final   • nRBC 03/27/2023 0.0  0.0 - 0.2 /100 WBC Final       Assessment & Plan   Healthy female exam.    Diagnoses and all orders for this visit:    1. Wellness examination (Primary)  -     CBC & Differential  -     Comprehensive Metabolic Panel  -     Lipid Panel  -     Hemoglobin A1c  -     TSH    2. Class 1 obesity due to excess calories without serious comorbidity in adult, unspecified BMI  -     phentermine 37.5 MG capsule; Take 1 capsule by mouth Every Morning.  Dispense: 90 capsule; Refill: 0    3. Encounter for screening mammogram for breast cancer  -     Mammo Screening Digital Tomosynthesis Bilateral With CAD; Future    Other orders  -     promethazine (PHENERGAN) 25 MG tablet; Take 1 tablet by mouth Every 6 (Six) Hours As Needed for Nausea or Vomiting.  Dispense: 20 tablet; Refill: 0  -     sulfamethoxazole-trimethoprim (Bactrim DS) 800-160 MG per tablet; Take 1 tablet by mouth 2 (Two) Times a Day.  Dispense: 14 tablet; Refill: 1      1. Well exam  2. Patient Counseling:  --Nutrition: Stressed importance of moderation in sodium/caffeine intake, saturated fat and cholesterol, caloric balance, sufficient intake of fresh fruits, vegetables, fiber, calcium, iron  --Exercise: Stressed the importance of regular exercise.   --Dental health: Discussed importance of regular tooth brushing, flossing, and dental  visits.  --Immunizations reviewed.  --Discussed benefits of screening colonoscopy.    3. Discussed the patient's BMI with her.  The BMI is above average; BMI management plan is completed  4. Follow up in one year

## 2023-03-27 ENCOUNTER — LAB (OUTPATIENT)
Dept: FAMILY MEDICINE CLINIC | Facility: CLINIC | Age: 68
End: 2023-03-27
Payer: COMMERCIAL

## 2023-03-27 LAB
ALBUMIN SERPL-MCNC: 4.1 G/DL (ref 3.5–5.2)
ALBUMIN/GLOB SERPL: 1.5 G/DL
ALP SERPL-CCNC: 88 U/L (ref 39–117)
ALT SERPL W P-5'-P-CCNC: 16 U/L (ref 1–33)
ANION GAP SERPL CALCULATED.3IONS-SCNC: 11 MMOL/L (ref 5–15)
AST SERPL-CCNC: 21 U/L (ref 1–32)
BASOPHILS # BLD AUTO: 0.07 10*3/MM3 (ref 0–0.2)
BASOPHILS NFR BLD AUTO: 0.9 % (ref 0–1.5)
BILIRUB SERPL-MCNC: 0.4 MG/DL (ref 0–1.2)
BUN SERPL-MCNC: 18 MG/DL (ref 8–23)
BUN/CREAT SERPL: 17.1 (ref 7–25)
CALCIUM SPEC-SCNC: 8.8 MG/DL (ref 8.6–10.5)
CHLORIDE SERPL-SCNC: 105 MMOL/L (ref 98–107)
CHOLEST SERPL-MCNC: 202 MG/DL (ref 0–200)
CO2 SERPL-SCNC: 23 MMOL/L (ref 22–29)
CREAT SERPL-MCNC: 1.05 MG/DL (ref 0.57–1)
DEPRECATED RDW RBC AUTO: 39.7 FL (ref 37–54)
EGFRCR SERPLBLD CKD-EPI 2021: 58.7 ML/MIN/1.73
EOSINOPHIL # BLD AUTO: 0.11 10*3/MM3 (ref 0–0.4)
EOSINOPHIL NFR BLD AUTO: 1.5 % (ref 0.3–6.2)
ERYTHROCYTE [DISTWIDTH] IN BLOOD BY AUTOMATED COUNT: 12.5 % (ref 12.3–15.4)
GLOBULIN UR ELPH-MCNC: 2.8 GM/DL
GLUCOSE SERPL-MCNC: 93 MG/DL (ref 65–99)
HBA1C MFR BLD: 5 % (ref 4.8–5.6)
HCT VFR BLD AUTO: 40.2 % (ref 34–46.6)
HDLC SERPL-MCNC: 58 MG/DL (ref 40–60)
HGB BLD-MCNC: 13.4 G/DL (ref 12–15.9)
IMM GRANULOCYTES # BLD AUTO: 0.03 10*3/MM3 (ref 0–0.05)
IMM GRANULOCYTES NFR BLD AUTO: 0.4 % (ref 0–0.5)
LDLC SERPL CALC-MCNC: 112 MG/DL (ref 0–100)
LDLC/HDLC SERPL: 1.85 {RATIO}
LYMPHOCYTES # BLD AUTO: 2.12 10*3/MM3 (ref 0.7–3.1)
LYMPHOCYTES NFR BLD AUTO: 28.2 % (ref 19.6–45.3)
MCH RBC QN AUTO: 28.9 PG (ref 26.6–33)
MCHC RBC AUTO-ENTMCNC: 33.3 G/DL (ref 31.5–35.7)
MCV RBC AUTO: 86.8 FL (ref 79–97)
MONOCYTES # BLD AUTO: 0.6 10*3/MM3 (ref 0.1–0.9)
MONOCYTES NFR BLD AUTO: 8 % (ref 5–12)
NEUTROPHILS NFR BLD AUTO: 4.58 10*3/MM3 (ref 1.7–7)
NEUTROPHILS NFR BLD AUTO: 61 % (ref 42.7–76)
NRBC BLD AUTO-RTO: 0 /100 WBC (ref 0–0.2)
PLATELET # BLD AUTO: 231 10*3/MM3 (ref 140–450)
PMV BLD AUTO: 12 FL (ref 6–12)
POTASSIUM SERPL-SCNC: 4.2 MMOL/L (ref 3.5–5.2)
PROT SERPL-MCNC: 6.9 G/DL (ref 6–8.5)
RBC # BLD AUTO: 4.63 10*6/MM3 (ref 3.77–5.28)
SODIUM SERPL-SCNC: 139 MMOL/L (ref 136–145)
TRIGL SERPL-MCNC: 184 MG/DL (ref 0–150)
TSH SERPL DL<=0.05 MIU/L-ACNC: 1.78 UIU/ML (ref 0.27–4.2)
VLDLC SERPL-MCNC: 32 MG/DL (ref 5–40)
WBC NRBC COR # BLD: 7.51 10*3/MM3 (ref 3.4–10.8)

## 2023-03-27 PROCEDURE — 36415 COLL VENOUS BLD VENIPUNCTURE: CPT | Performed by: FAMILY MEDICINE

## 2023-03-27 PROCEDURE — 84443 ASSAY THYROID STIM HORMONE: CPT | Performed by: FAMILY MEDICINE

## 2023-03-27 PROCEDURE — 85025 COMPLETE CBC W/AUTO DIFF WBC: CPT | Performed by: FAMILY MEDICINE

## 2023-03-27 PROCEDURE — 80061 LIPID PANEL: CPT | Performed by: FAMILY MEDICINE

## 2023-03-27 PROCEDURE — 83036 HEMOGLOBIN GLYCOSYLATED A1C: CPT | Performed by: FAMILY MEDICINE

## 2023-03-27 PROCEDURE — 80053 COMPREHEN METABOLIC PANEL: CPT | Performed by: FAMILY MEDICINE

## 2023-04-07 RX ORDER — LISINOPRIL 5 MG/1
TABLET ORAL
Qty: 30 TABLET | Refills: 0 | Status: SHIPPED | OUTPATIENT
Start: 2023-04-07

## 2023-04-28 RX ORDER — ESOMEPRAZOLE MAGNESIUM 40 MG/1
CAPSULE, DELAYED RELEASE ORAL
Qty: 30 CAPSULE | Refills: 4 | Status: SHIPPED | OUTPATIENT
Start: 2023-04-28

## 2023-05-08 RX ORDER — LISINOPRIL 5 MG/1
TABLET ORAL
Qty: 30 TABLET | Refills: 0 | Status: SHIPPED | OUTPATIENT
Start: 2023-05-08

## 2023-06-07 RX ORDER — LISINOPRIL 5 MG/1
TABLET ORAL
Qty: 30 TABLET | Refills: 5 | Status: SHIPPED | OUTPATIENT
Start: 2023-06-07

## 2023-10-11 RX ORDER — ESOMEPRAZOLE MAGNESIUM 40 MG/1
CAPSULE, DELAYED RELEASE ORAL
Qty: 30 CAPSULE | Refills: 4 | Status: SHIPPED | OUTPATIENT
Start: 2023-10-11

## 2023-11-02 ENCOUNTER — TELEPHONE (OUTPATIENT)
Dept: FAMILY MEDICINE CLINIC | Facility: CLINIC | Age: 68
End: 2023-11-02

## 2023-11-02 DIAGNOSIS — Z78.0 POSTMENOPAUSAL: ICD-10-CM

## 2023-11-02 DIAGNOSIS — Z12.31 ENCOUNTER FOR SCREENING MAMMOGRAM FOR BREAST CANCER: Primary | ICD-10-CM

## 2023-11-02 NOTE — TELEPHONE ENCOUNTER
Caller: Anjelica Portillo    Relationship: Self    Best call back number: [phone]    What orders are you requesting (i.e. lab or imaging): MAMMOGRAM AND DEXA SCAN    In what timeframe would the patient need to come in: ASAP    Where will you receive your lab/imaging services: [facility]    Additional notes: PATIENT CALLED AND SAID THAT SHE IS DUE FOR HER MAMMOGRAM AND DEXA SCAN, SHE IS WANTING THE ORDERS SENT   TO [facility]

## 2023-11-10 ENCOUNTER — CLINICAL SUPPORT (OUTPATIENT)
Dept: FAMILY MEDICINE CLINIC | Facility: CLINIC | Age: 68
End: 2023-11-10
Payer: COMMERCIAL

## 2023-11-10 DIAGNOSIS — Z23 NEED FOR VACCINATION: Primary | ICD-10-CM

## 2023-11-10 NOTE — PROGRESS NOTES
Injection  Injection performed in LEFT DELTOID by Cathy Ram. Patient tolerated the procedure well without complications.  11/10/23   Cathy Ram

## 2023-12-04 RX ORDER — LISINOPRIL 5 MG/1
5 TABLET ORAL DAILY
Qty: 30 TABLET | Refills: 5 | Status: SHIPPED | OUTPATIENT
Start: 2023-12-04

## 2024-01-09 ENCOUNTER — OFFICE VISIT (OUTPATIENT)
Dept: FAMILY MEDICINE CLINIC | Facility: CLINIC | Age: 69
End: 2024-01-09
Payer: COMMERCIAL

## 2024-01-09 ENCOUNTER — LAB (OUTPATIENT)
Dept: FAMILY MEDICINE CLINIC | Facility: CLINIC | Age: 69
End: 2024-01-09
Payer: COMMERCIAL

## 2024-01-09 VITALS
SYSTOLIC BLOOD PRESSURE: 148 MMHG | OXYGEN SATURATION: 99 % | TEMPERATURE: 97.8 F | BODY MASS INDEX: 29.27 KG/M2 | HEIGHT: 63 IN | WEIGHT: 165.2 LBS | HEART RATE: 83 BPM | DIASTOLIC BLOOD PRESSURE: 90 MMHG

## 2024-01-09 DIAGNOSIS — R19.4 CHANGE IN BOWEL HABIT: Primary | ICD-10-CM

## 2024-01-09 DIAGNOSIS — R53.83 OTHER FATIGUE: ICD-10-CM

## 2024-01-09 DIAGNOSIS — H61.22 IMPACTED CERUMEN OF LEFT EAR: ICD-10-CM

## 2024-01-09 DIAGNOSIS — Z12.11 SCREEN FOR COLON CANCER: ICD-10-CM

## 2024-01-09 LAB
ALBUMIN SERPL-MCNC: 4.4 G/DL (ref 3.5–5.2)
ALBUMIN/GLOB SERPL: 1.6 G/DL
ALP SERPL-CCNC: 130 U/L (ref 39–117)
ALT SERPL W P-5'-P-CCNC: 35 U/L (ref 1–33)
ANION GAP SERPL CALCULATED.3IONS-SCNC: 12.8 MMOL/L (ref 5–15)
AST SERPL-CCNC: 25 U/L (ref 1–32)
BASOPHILS # BLD AUTO: 0.07 10*3/MM3 (ref 0–0.2)
BASOPHILS NFR BLD AUTO: 1 % (ref 0–1.5)
BILIRUB SERPL-MCNC: 0.3 MG/DL (ref 0–1.2)
BUN SERPL-MCNC: 20 MG/DL (ref 8–23)
BUN/CREAT SERPL: 24.7 (ref 7–25)
CALCIUM SPEC-SCNC: 8.9 MG/DL (ref 8.6–10.5)
CHLORIDE SERPL-SCNC: 104 MMOL/L (ref 98–107)
CO2 SERPL-SCNC: 23.2 MMOL/L (ref 22–29)
CREAT SERPL-MCNC: 0.81 MG/DL (ref 0.57–1)
DEPRECATED RDW RBC AUTO: 37.8 FL (ref 37–54)
EGFRCR SERPLBLD CKD-EPI 2021: 79.7 ML/MIN/1.73
EOSINOPHIL # BLD AUTO: 0.12 10*3/MM3 (ref 0–0.4)
EOSINOPHIL NFR BLD AUTO: 1.6 % (ref 0.3–6.2)
ERYTHROCYTE [DISTWIDTH] IN BLOOD BY AUTOMATED COUNT: 11.8 % (ref 12.3–15.4)
GLOBULIN UR ELPH-MCNC: 2.8 GM/DL
GLUCOSE SERPL-MCNC: 97 MG/DL (ref 65–99)
HCT VFR BLD AUTO: 42.5 % (ref 34–46.6)
HGB BLD-MCNC: 14 G/DL (ref 12–15.9)
IMM GRANULOCYTES # BLD AUTO: 0.05 10*3/MM3 (ref 0–0.05)
IMM GRANULOCYTES NFR BLD AUTO: 0.7 % (ref 0–0.5)
LYMPHOCYTES # BLD AUTO: 2.19 10*3/MM3 (ref 0.7–3.1)
LYMPHOCYTES NFR BLD AUTO: 29.9 % (ref 19.6–45.3)
MCH RBC QN AUTO: 28.9 PG (ref 26.6–33)
MCHC RBC AUTO-ENTMCNC: 32.9 G/DL (ref 31.5–35.7)
MCV RBC AUTO: 87.8 FL (ref 79–97)
MONOCYTES # BLD AUTO: 0.62 10*3/MM3 (ref 0.1–0.9)
MONOCYTES NFR BLD AUTO: 8.5 % (ref 5–12)
NEUTROPHILS NFR BLD AUTO: 4.27 10*3/MM3 (ref 1.7–7)
NEUTROPHILS NFR BLD AUTO: 58.3 % (ref 42.7–76)
NRBC BLD AUTO-RTO: 0.1 /100 WBC (ref 0–0.2)
PLATELET # BLD AUTO: 289 10*3/MM3 (ref 140–450)
PMV BLD AUTO: 11.2 FL (ref 6–12)
POTASSIUM SERPL-SCNC: 4.3 MMOL/L (ref 3.5–5.2)
PROT SERPL-MCNC: 7.2 G/DL (ref 6–8.5)
RBC # BLD AUTO: 4.84 10*6/MM3 (ref 3.77–5.28)
SODIUM SERPL-SCNC: 140 MMOL/L (ref 136–145)
TSH SERPL DL<=0.05 MIU/L-ACNC: 1.67 UIU/ML (ref 0.27–4.2)
WBC NRBC COR # BLD AUTO: 7.32 10*3/MM3 (ref 3.4–10.8)

## 2024-01-09 PROCEDURE — 84443 ASSAY THYROID STIM HORMONE: CPT | Performed by: FAMILY MEDICINE

## 2024-01-09 PROCEDURE — 85025 COMPLETE CBC W/AUTO DIFF WBC: CPT | Performed by: FAMILY MEDICINE

## 2024-01-09 PROCEDURE — 36415 COLL VENOUS BLD VENIPUNCTURE: CPT | Performed by: FAMILY MEDICINE

## 2024-01-09 PROCEDURE — 80053 COMPREHEN METABOLIC PANEL: CPT | Performed by: FAMILY MEDICINE

## 2024-01-09 RX ORDER — LISINOPRIL 10 MG/1
10 TABLET ORAL DAILY
Start: 2024-01-09

## 2024-01-09 NOTE — PROGRESS NOTES
"Chief Complaint  GI Problem, Abdominal Pain, Gas, and Diarrhea (Loose stools )    Subjective        Anjelica Portillo presents to [facility]  History of Present Illness  She fell at work recently and has a concussion and some bruising of her leg and foot.     GI Problem  The primary symptoms include abdominal pain, nausea and diarrhea. Primary symptoms do not include fever, vomiting, melena, hematochezia or dysuria. The illness began more than 7 days ago. Progression since onset: coming and going.   The illness does not include chills. Significant associated medical issues include GERD. Associated medical issues do not include gastric bypass or bowel resection.   Abdominal Pain  This is a new problem. The current episode started more than 1 month ago. The problem occurs intermittently. The problem has been coming and going. The pain is located in the generalized abdominal region. Associated symptoms include diarrhea, flatus and nausea. Pertinent negatives include no dysuria, fever, hematochezia, melena or vomiting. Her past medical history is significant for GERD.   Gas  Associated symptoms include abdominal pain and nausea. Pertinent negatives include no chills, fever or vomiting.   Diarrhea   Associated symptoms include abdominal pain and increased flatus. Pertinent negatives include no chills, fever or vomiting. There is no history of bowel resection.       Objective   Vital Signs:  /90 (BP Location: Left arm, Patient Position: Sitting, Cuff Size: Adult)   Pulse 83   Temp 97.8 °F (36.6 °C) (Infrared)   Ht 160 cm (63\")   Wt 74.9 kg (165 lb 3.2 oz)   SpO2 99%   BMI 29.26 kg/m²   Estimated body mass index is 29.26 kg/m² as calculated from the following:    Height as of this encounter: 160 cm (63\").    Weight as of this encounter: 74.9 kg (165 lb 3.2 oz).     BMI is >= 25 and <30. (Overweight) The following options were offered after discussion;: exercise " counseling/recommendations           Physical Exam  Vitals and nursing note reviewed.   Constitutional:       General: She is not in acute distress.     Appearance: She is well-developed.   HENT:      Head: Normocephalic.      Left Ear: There is impacted cerumen.   Eyes:      General: Lids are normal.   Neck:      Thyroid: No thyroid mass or thyromegaly.      Trachea: Trachea normal.   Cardiovascular:      Rate and Rhythm: Normal rate and regular rhythm.      Heart sounds: Normal heart sounds.   Pulmonary:      Effort: Pulmonary effort is normal.      Breath sounds: Normal breath sounds.   Abdominal:      Palpations: Abdomen is soft.   Musculoskeletal:      Cervical back: Normal range of motion.   Lymphadenopathy:      Cervical: No cervical adenopathy.   Skin:     General: Skin is warm and dry.   Neurological:      Mental Status: She is alert and oriented to person, place, and time.   Psychiatric:         Attention and Perception: She is attentive.         Mood and Affect: Mood normal.         Speech: Speech normal.         Behavior: Behavior normal.        Result Review :  The following data was reviewed by: Manjula Vergara MD on 01/09/2024:  Common labs          3/27/2023    08:32 1/9/2024    16:12   Common Labs   Glucose 93  97    BUN 18  20    Creatinine 1.05  0.81    Sodium 139  140    Potassium 4.2  4.3    Chloride 105  104    Calcium 8.8  8.9    Albumin 4.1  4.4    Total Bilirubin 0.4  0.3    Alkaline Phosphatase 88  130    AST (SGOT) 21  25    ALT (SGPT) 16  35    WBC 7.51  7.32    Hemoglobin 13.4  14.0    Hematocrit 40.2  42.5    Platelets 231  289    Total Cholesterol 202     Triglycerides 184     HDL Cholesterol 58     LDL Cholesterol  112     Hemoglobin A1C 5.00             Ear Cerumen Removal    Date/Time: 1/9/2024 4:21 PM    Performed by: Manjula Vergara MD  Authorized by: Manjula Vergara MD  Ceruminolytics applied: Ceruminolytics applied prior to the procedure.  Location details: left  ear  Patient tolerance: patient tolerated the procedure well with no immediate complications  Procedure type: irrigation           Assessment and Plan   Diagnoses and all orders for this visit:    1. Change in bowel habit (Primary)  -     Ambulatory Referral For Screening Colonoscopy  -     TSH  -     CBC & Differential  -     Comprehensive Metabolic Panel    2. Screen for colon cancer  -     Ambulatory Referral For Screening Colonoscopy    3. Other fatigue  -     TSH  -     CBC & Differential  -     Comprehensive Metabolic Panel    4. Impacted cerumen of left ear  -     Ear Cerumen Removal    Other orders  -     lisinopril (PRINIVIL,ZESTRIL) 10 MG tablet; Take 1 tablet by mouth Daily.             Follow Up   No follow-ups on file.  Patient was given instructions and counseling regarding her condition or for health maintenance advice. Please see specific information pulled into the AVS if appropriate.

## 2024-01-10 NOTE — PROGRESS NOTES
The pt was verbally notified and understood. She wanted her results mailed to her, I have done that and I did confirm her address.

## 2024-02-21 ENCOUNTER — TELEPHONE (OUTPATIENT)
Dept: FAMILY MEDICINE CLINIC | Facility: CLINIC | Age: 69
End: 2024-02-21
Payer: COMMERCIAL

## 2024-02-26 RX ORDER — LISINOPRIL 10 MG/1
10 TABLET ORAL DAILY
Qty: 90 TABLET | Refills: 0
Start: 2024-02-26

## 2024-02-26 NOTE — TELEPHONE ENCOUNTER
Caller: Anjelica Portillo    Relationship: Self    Best call back number: [phone]    Requested Prescriptions:   Requested Prescriptions     Pending Prescriptions Disp Refills    lisinopril (PRINIVIL,ZESTRIL) 10 MG tablet       Sig: Take 1 tablet by mouth Daily.        Pharmacy where request should be sent: [facility] [id number] [address] [facility] - [phone] [facility] [phone] FX     Last office visit with prescribing clinician: 1/9/2024   Last telemedicine visit with prescribing clinician: Visit date not found   Next office visit with prescribing clinician: 3/27/2024     Additional details provided by patient: NOP PT STATED SHE CANNOT GET IN TO SEE THE GI DOCTOR UNTIL APRIL 12TH. 2024 REGARDING COLONOSCOPY. PLEASE ADVISE    Does the patient have less than a 3 day supply:  [x] Yes  [] No    Would you like a call back once the refill request has been completed: [x] Yes [] No    If the office needs to give you a call back, can they leave a voicemail: [x] Yes [] No    Francisco Maldonado Rep   02/26/24 08:43 EST

## 2024-03-01 ENCOUNTER — TELEPHONE (OUTPATIENT)
Dept: FAMILY MEDICINE CLINIC | Facility: CLINIC | Age: 69
End: 2024-03-01
Payer: COMMERCIAL

## 2024-03-01 NOTE — TELEPHONE ENCOUNTER
Pt called in stating the pharmacy never received the Lisinopril       It looks like  refilled it but there was a EPrescribing error I called this in verbally w/ the pharmacy        Pt was called back abd verbally informed

## 2024-04-08 ENCOUNTER — OFFICE (AMBULATORY)
Dept: URBAN - METROPOLITAN AREA CLINIC 64 | Facility: CLINIC | Age: 69
End: 2024-04-08

## 2024-04-08 VITALS
WEIGHT: 166 LBS | HEART RATE: 63 BPM | DIASTOLIC BLOOD PRESSURE: 85 MMHG | HEIGHT: 64 IN | SYSTOLIC BLOOD PRESSURE: 128 MMHG

## 2024-04-08 DIAGNOSIS — R19.4 CHANGE IN BOWEL HABIT: ICD-10-CM

## 2024-04-08 DIAGNOSIS — R15.9 FULL INCONTINENCE OF FECES: ICD-10-CM

## 2024-04-08 DIAGNOSIS — Z86.010 PERSONAL HISTORY OF COLONIC POLYPS: ICD-10-CM

## 2024-04-08 PROCEDURE — 99204 OFFICE O/P NEW MOD 45 MIN: CPT | Performed by: INTERNAL MEDICINE

## 2024-04-08 RX ORDER — ONDANSETRON 4 MG/1
TABLET, ORALLY DISINTEGRATING ORAL
Qty: 5 | Refills: 0 | Status: ACTIVE
Start: 2024-04-08

## 2024-04-10 ENCOUNTER — OFFICE (AMBULATORY)
Dept: URBAN - METROPOLITAN AREA PATHOLOGY 19 | Facility: PATHOLOGY | Age: 69
End: 2024-04-10

## 2024-04-10 ENCOUNTER — ON CAMPUS - OUTPATIENT (AMBULATORY)
Dept: URBAN - METROPOLITAN AREA HOSPITAL 2 | Facility: HOSPITAL | Age: 69
End: 2024-04-10

## 2024-04-10 ENCOUNTER — OFFICE (AMBULATORY)
Dept: URBAN - METROPOLITAN AREA PATHOLOGY 19 | Facility: PATHOLOGY | Age: 69
End: 2024-04-10
Payer: COMMERCIAL

## 2024-04-10 VITALS
TEMPERATURE: 95.7 F | OXYGEN SATURATION: 98 % | OXYGEN SATURATION: 100 % | DIASTOLIC BLOOD PRESSURE: 73 MMHG | DIASTOLIC BLOOD PRESSURE: 72 MMHG | SYSTOLIC BLOOD PRESSURE: 129 MMHG | SYSTOLIC BLOOD PRESSURE: 110 MMHG | OXYGEN SATURATION: 99 % | HEART RATE: 66 BPM | HEART RATE: 74 BPM | DIASTOLIC BLOOD PRESSURE: 59 MMHG | HEART RATE: 82 BPM | HEIGHT: 64 IN | DIASTOLIC BLOOD PRESSURE: 68 MMHG | SYSTOLIC BLOOD PRESSURE: 104 MMHG | RESPIRATION RATE: 16 BRPM | DIASTOLIC BLOOD PRESSURE: 63 MMHG | SYSTOLIC BLOOD PRESSURE: 121 MMHG | WEIGHT: 161 LBS | RESPIRATION RATE: 18 BRPM | DIASTOLIC BLOOD PRESSURE: 77 MMHG | SYSTOLIC BLOOD PRESSURE: 96 MMHG | SYSTOLIC BLOOD PRESSURE: 127 MMHG | HEART RATE: 84 BPM | DIASTOLIC BLOOD PRESSURE: 69 MMHG | HEART RATE: 72 BPM | HEART RATE: 86 BPM | HEART RATE: 80 BPM | SYSTOLIC BLOOD PRESSURE: 119 MMHG

## 2024-04-10 DIAGNOSIS — D12.3 BENIGN NEOPLASM OF TRANSVERSE COLON: ICD-10-CM

## 2024-04-10 DIAGNOSIS — D12.2 BENIGN NEOPLASM OF ASCENDING COLON: ICD-10-CM

## 2024-04-10 DIAGNOSIS — Z86.010 PERSONAL HISTORY OF COLONIC POLYPS: ICD-10-CM

## 2024-04-10 DIAGNOSIS — K57.30 DIVERTICULOSIS OF LARGE INTESTINE WITHOUT PERFORATION OR ABS: ICD-10-CM

## 2024-04-10 DIAGNOSIS — Z09 ENCOUNTER FOR FOLLOW-UP EXAMINATION AFTER COMPLETED TREATMEN: ICD-10-CM

## 2024-04-10 DIAGNOSIS — R19.4 CHANGE IN BOWEL HABIT: ICD-10-CM

## 2024-04-10 PROBLEM — K63.5 POLYP OF COLON: Status: ACTIVE | Noted: 2024-04-10

## 2024-04-10 LAB
GI HISTOLOGY: A. WHOLE COLON: (no result)
GI HISTOLOGY: B. ASCENDING COLON: (no result)
GI HISTOLOGY: C. TRANSVERSE COLON: (no result)
GI HISTOLOGY: PDF REPORT: (no result)

## 2024-04-10 PROCEDURE — 45385 COLONOSCOPY W/LESION REMOVAL: CPT | Mod: 33 | Performed by: INTERNAL MEDICINE

## 2024-04-10 PROCEDURE — 88305 TISSUE EXAM BY PATHOLOGIST: CPT | Mod: 26 | Performed by: PATHOLOGY

## 2024-04-18 RX ORDER — ESOMEPRAZOLE MAGNESIUM 40 MG/1
40 CAPSULE, DELAYED RELEASE ORAL DAILY
Qty: 30 CAPSULE | Refills: 4 | Status: SHIPPED | OUTPATIENT
Start: 2024-04-18

## 2024-04-23 ENCOUNTER — OFFICE VISIT (OUTPATIENT)
Dept: FAMILY MEDICINE CLINIC | Facility: CLINIC | Age: 69
End: 2024-04-23
Payer: COMMERCIAL

## 2024-04-23 VITALS
HEART RATE: 77 BPM | HEIGHT: 63 IN | BODY MASS INDEX: 28.92 KG/M2 | SYSTOLIC BLOOD PRESSURE: 122 MMHG | DIASTOLIC BLOOD PRESSURE: 80 MMHG | WEIGHT: 163.2 LBS | OXYGEN SATURATION: 96 % | TEMPERATURE: 98.4 F

## 2024-04-23 DIAGNOSIS — Z00.00 WELLNESS EXAMINATION: Primary | ICD-10-CM

## 2024-04-23 DIAGNOSIS — E66.3 OVERWEIGHT (BMI 25.0-29.9): ICD-10-CM

## 2024-04-23 PROCEDURE — 99397 PER PM REEVAL EST PAT 65+ YR: CPT | Performed by: FAMILY MEDICINE

## 2024-04-23 RX ORDER — PHENTERMINE HYDROCHLORIDE 37.5 MG/1
37.5 CAPSULE ORAL EVERY MORNING
Qty: 30 CAPSULE | Refills: 2 | Status: SHIPPED | OUTPATIENT
Start: 2024-04-23

## 2024-04-23 RX ORDER — AZELAIC ACID 0.15 G/G
GEL TOPICAL
COMMUNITY
Start: 2024-04-09

## 2024-04-23 NOTE — PROGRESS NOTES
"Shefali Portillo is a 68 y.o. female and is here for a comprehensive physical exam. The patient reports problems - increased stress - building a house,  has health problems. .    Do you take any herbs or supplements that were not prescribed by a doctor? no  Are you taking calcium supplements? no  Are you taking aspirin daily? no    The following portions of the patient's history were reviewed and updated as appropriate: allergies, current medications, past family history, past medical history, past social history, past surgical history, and problem list.    Review of Systems  Do you have pain that bothers you in your daily life? not asked  Pertinent items are noted in HPI.    Objective   /80 (BP Location: Left arm, Patient Position: Sitting, Cuff Size: Adult)   Pulse 77   Temp 98.4 °F (36.9 °C) (Infrared)   Ht 160 cm (63\")   Wt 74 kg (163 lb 3.2 oz)   SpO2 96%   BMI 28.91 kg/m²   General appearance: alert, appears stated age, and cooperative  Head: Normocephalic, without obvious abnormality, atraumatic  Eyes: conjunctivae/corneas clear. PERRL, EOM's intact. Fundi benign.  Throat: lips, mucosa, and tongue normal; teeth and gums normal  Neck: no adenopathy, no JVD, supple, symmetrical, trachea midline, and thyroid not enlarged, symmetric, no tenderness/mass/nodules  Lungs: clear to auscultation bilaterally  Heart: regular rate and rhythm, S1, S2 normal, no murmur, click, rub or gallop  Extremities: extremities normal, atraumatic, no cyanosis or edema  Pulses: 2+ and symmetric  Skin: Skin color, texture, turgor normal. No rashes or lesions  Lymph nodes: Cervical, supraclavicular, and axillary nodes normal.  Neurologic: Grossly normal     No visits with results within 1 Week(s) from this visit.   Latest known visit with results is:   Office Visit on 01/09/2024   Component Date Value Ref Range Status    TSH 01/09/2024 1.670  0.270 - 4.200 uIU/mL Final    Glucose 01/09/2024 97  65 - 99 mg/dL " Final    BUN 01/09/2024 20  8 - 23 mg/dL Final    Creatinine 01/09/2024 0.81  0.57 - 1.00 mg/dL Final    Sodium 01/09/2024 140  136 - 145 mmol/L Final    Potassium 01/09/2024 4.3  3.5 - 5.2 mmol/L Final    Chloride 01/09/2024 104  98 - 107 mmol/L Final    CO2 01/09/2024 23.2  22.0 - 29.0 mmol/L Final    Calcium 01/09/2024 8.9  8.6 - 10.5 mg/dL Final    Total Protein 01/09/2024 7.2  6.0 - 8.5 g/dL Final    Albumin 01/09/2024 4.4  3.5 - 5.2 g/dL Final    ALT (SGPT) 01/09/2024 35 (H)  1 - 33 U/L Final    AST (SGOT) 01/09/2024 25  1 - 32 U/L Final    Alkaline Phosphatase 01/09/2024 130 (H)  39 - 117 U/L Final    Total Bilirubin 01/09/2024 0.3  0.0 - 1.2 mg/dL Final    Globulin 01/09/2024 2.8  gm/dL Final    A/G Ratio 01/09/2024 1.6  g/dL Final    BUN/Creatinine Ratio 01/09/2024 24.7  7.0 - 25.0 Final    Anion Gap 01/09/2024 12.8  5.0 - 15.0 mmol/L Final    eGFR 01/09/2024 79.7  >60.0 mL/min/1.73 Final    WBC 01/09/2024 7.32  3.40 - 10.80 10*3/mm3 Final    RBC 01/09/2024 4.84  3.77 - 5.28 10*6/mm3 Final    Hemoglobin 01/09/2024 14.0  12.0 - 15.9 g/dL Final    Hematocrit 01/09/2024 42.5  34.0 - 46.6 % Final    MCV 01/09/2024 87.8  79.0 - 97.0 fL Final    MCH 01/09/2024 28.9  26.6 - 33.0 pg Final    MCHC 01/09/2024 32.9  31.5 - 35.7 g/dL Final    RDW 01/09/2024 11.8 (L)  12.3 - 15.4 % Final    RDW-SD 01/09/2024 37.8  37.0 - 54.0 fl Final    MPV 01/09/2024 11.2  6.0 - 12.0 fL Final    Platelets 01/09/2024 289  140 - 450 10*3/mm3 Final    Neutrophil % 01/09/2024 58.3  42.7 - 76.0 % Final    Lymphocyte % 01/09/2024 29.9  19.6 - 45.3 % Final    Monocyte % 01/09/2024 8.5  5.0 - 12.0 % Final    Eosinophil % 01/09/2024 1.6  0.3 - 6.2 % Final    Basophil % 01/09/2024 1.0  0.0 - 1.5 % Final    Immature Grans % 01/09/2024 0.7 (H)  0.0 - 0.5 % Final    Neutrophils, Absolute 01/09/2024 4.27  1.70 - 7.00 10*3/mm3 Final    Lymphocytes, Absolute 01/09/2024 2.19  0.70 - 3.10 10*3/mm3 Final    Monocytes, Absolute 01/09/2024 0.62  0.10 -  0.90 10*3/mm3 Final    Eosinophils, Absolute 01/09/2024 0.12  0.00 - 0.40 10*3/mm3 Final    Basophils, Absolute 01/09/2024 0.07  0.00 - 0.20 10*3/mm3 Final    Immature Grans, Absolute 01/09/2024 0.05  0.00 - 0.05 10*3/mm3 Final    nRBC 01/09/2024 0.1  0.0 - 0.2 /100 WBC Final       Assessment & Plan   Healthy female exam.   Diagnoses and all orders for this visit:    1. Wellness examination (Primary)  -     Lipid Panel  -     Comprehensive Metabolic Panel  -     Hemoglobin A1c  -     Hepatitis panel, acute; Future    2. Overweight (BMI 25.0-29.9)  -     phentermine 37.5 MG capsule; Take 1 capsule by mouth Every Morning.  Dispense: 30 capsule; Refill: 2      1. Well exam.   2. Patient Counseling:  --Nutrition: Stressed importance of moderation in sodium/caffeine intake, saturated fat and cholesterol, caloric balance, sufficient intake of fresh fruits, vegetables, fiber, calcium, iron  --Exercise: Stressed the importance of regular exercise.   --Dental health: Discussed importance of regular tooth brushing, flossing, and dental visits.  --Immunizations reviewed.    3. Discussed the patient's BMI with her.  The BMI is above average; BMI management plan is completed  4. Follow up in one year

## 2024-05-29 RX ORDER — LISINOPRIL 10 MG/1
10 TABLET ORAL DAILY
Qty: 30 TABLET | Refills: 2 | Status: SHIPPED | OUTPATIENT
Start: 2024-05-29

## 2024-08-25 RX ORDER — LISINOPRIL 10 MG/1
10 TABLET ORAL DAILY
Qty: 30 TABLET | Refills: 2 | Status: SHIPPED | OUTPATIENT
Start: 2024-08-25

## 2024-09-23 RX ORDER — ESOMEPRAZOLE MAGNESIUM 40 MG/1
40 CAPSULE, DELAYED RELEASE ORAL DAILY
Qty: 30 CAPSULE | Refills: 4 | Status: SHIPPED | OUTPATIENT
Start: 2024-09-23

## 2024-10-30 ENCOUNTER — LAB (OUTPATIENT)
Dept: FAMILY MEDICINE CLINIC | Facility: CLINIC | Age: 69
End: 2024-10-30
Payer: COMMERCIAL

## 2024-10-30 ENCOUNTER — CLINICAL SUPPORT (OUTPATIENT)
Dept: FAMILY MEDICINE CLINIC | Facility: CLINIC | Age: 69
End: 2024-10-30
Payer: COMMERCIAL

## 2024-10-30 DIAGNOSIS — Z00.00 WELLNESS EXAMINATION: ICD-10-CM

## 2024-10-30 DIAGNOSIS — Z23 NEED FOR VACCINATION: Primary | ICD-10-CM

## 2024-10-30 LAB
ALBUMIN SERPL-MCNC: 4.1 G/DL (ref 3.5–5.2)
ALBUMIN/GLOB SERPL: 1.5 G/DL
ALP SERPL-CCNC: 108 U/L (ref 39–117)
ALT SERPL W P-5'-P-CCNC: 16 U/L (ref 1–33)
ANION GAP SERPL CALCULATED.3IONS-SCNC: 12 MMOL/L (ref 5–15)
AST SERPL-CCNC: 23 U/L (ref 1–32)
BILIRUB SERPL-MCNC: 0.6 MG/DL (ref 0–1.2)
BUN SERPL-MCNC: 17 MG/DL (ref 8–23)
BUN/CREAT SERPL: 16.8 (ref 7–25)
CALCIUM SPEC-SCNC: 8.9 MG/DL (ref 8.6–10.5)
CHLORIDE SERPL-SCNC: 104 MMOL/L (ref 98–107)
CHOLEST SERPL-MCNC: 201 MG/DL (ref 0–200)
CO2 SERPL-SCNC: 23 MMOL/L (ref 22–29)
CREAT SERPL-MCNC: 1.01 MG/DL (ref 0.57–1)
EGFRCR SERPLBLD CKD-EPI 2021: 60.8 ML/MIN/1.73
GLOBULIN UR ELPH-MCNC: 2.8 GM/DL
GLUCOSE SERPL-MCNC: 79 MG/DL (ref 65–99)
HAV IGM SERPL QL IA: NORMAL
HBA1C MFR BLD: 5.4 % (ref 4.8–5.6)
HBV CORE IGM SERPL QL IA: NORMAL
HBV SURFACE AG SERPL QL IA: NORMAL
HCV AB SER QL: NORMAL
HDLC SERPL-MCNC: 63 MG/DL (ref 40–60)
LDLC SERPL CALC-MCNC: 115 MG/DL (ref 0–100)
LDLC/HDLC SERPL: 1.78 {RATIO}
POTASSIUM SERPL-SCNC: 3.9 MMOL/L (ref 3.5–5.2)
PROT SERPL-MCNC: 6.9 G/DL (ref 6–8.5)
SODIUM SERPL-SCNC: 139 MMOL/L (ref 136–145)
TRIGL SERPL-MCNC: 129 MG/DL (ref 0–150)
VLDLC SERPL-MCNC: 23 MG/DL (ref 5–40)

## 2024-10-30 PROCEDURE — 80053 COMPREHEN METABOLIC PANEL: CPT | Performed by: FAMILY MEDICINE

## 2024-10-30 PROCEDURE — 90662 IIV NO PRSV INCREASED AG IM: CPT | Performed by: FAMILY MEDICINE

## 2024-10-30 PROCEDURE — 83036 HEMOGLOBIN GLYCOSYLATED A1C: CPT | Performed by: FAMILY MEDICINE

## 2024-10-30 PROCEDURE — 90471 IMMUNIZATION ADMIN: CPT | Performed by: FAMILY MEDICINE

## 2024-10-30 PROCEDURE — 80061 LIPID PANEL: CPT | Performed by: FAMILY MEDICINE

## 2024-10-30 PROCEDURE — 80074 ACUTE HEPATITIS PANEL: CPT | Performed by: FAMILY MEDICINE

## 2024-10-30 NOTE — PROGRESS NOTES
Injection  Injection performed in Left Deltoid by Ji Mitchell MA. Patient tolerated the procedure well without complications.  10/30/24   Ji Mitchell MA

## 2024-11-13 ENCOUNTER — TELEPHONE (OUTPATIENT)
Dept: FAMILY MEDICINE CLINIC | Facility: CLINIC | Age: 69
End: 2024-11-13

## 2024-11-13 RX ORDER — AZITHROMYCIN 250 MG/1
TABLET, FILM COATED ORAL
Qty: 6 TABLET | Refills: 0 | Status: SHIPPED | OUTPATIENT
Start: 2024-11-13

## 2024-11-13 NOTE — TELEPHONE ENCOUNTER
Caller: Anjelica Portillo    Relationship to patient: Self    Best call back number:     [phone] (Mobile)       Date of positive COVID19 test: YESTERDAY     Date of possible COVID19 exposure:     COVID19 symptoms: COUGHING, SINUS DRAINAGE, AND HEADACHES. FEVER ON AND OFF     Date of initial quarantine:     Additional information or concerns:  COMING HOME FROM SURGERY SOON       What is the patients preferred pharmacy: [facility]

## 2024-11-13 NOTE — TELEPHONE ENCOUNTER
I sent in a Rx for an antibiotic.  If she does not start to improve in a few days she will need to be seen in the office.

## 2024-11-19 ENCOUNTER — TELEPHONE (OUTPATIENT)
Dept: FAMILY MEDICINE CLINIC | Facility: CLINIC | Age: 69
End: 2024-11-19
Payer: COMMERCIAL

## 2024-11-19 RX ORDER — ESTRADIOL 1 MG/1
1 TABLET ORAL DAILY
Qty: 90 TABLET | Refills: 1 | Status: SHIPPED | OUTPATIENT
Start: 2024-11-19

## 2024-11-19 NOTE — TELEPHONE ENCOUNTER
Tried to call patient to see what were the symptoms she is experiencing and how long. Unable to leave a vm

## 2024-11-19 NOTE — TELEPHONE ENCOUNTER
Caller: Lindsey Anjelica S    Relationship: Self    Best call back number: [phone]     What medication are you requesting: estradiol (ESTRACE) 1 MG tablet     What are your current symptoms:     How long have you been experiencing symptoms:     Have you had these symptoms before:    [x] Yes  [] No    Have you been treated for these symptoms before:   [x] Yes  [] No    If a prescription is needed, what is your preferred pharmacy and phone number: [facility] [id number] [place] [address] [address] [facility] [address] - [phone]  - [phone] FX     Additional notes:

## 2024-11-22 RX ORDER — LISINOPRIL 10 MG/1
10 TABLET ORAL DAILY
Qty: 30 TABLET | Refills: 2 | Status: SHIPPED | OUTPATIENT
Start: 2024-11-22

## 2025-02-27 RX ORDER — LISINOPRIL 10 MG/1
10 TABLET ORAL DAILY
Qty: 30 TABLET | Refills: 2 | Status: SHIPPED | OUTPATIENT
Start: 2025-02-27

## 2025-04-07 RX ORDER — ESOMEPRAZOLE MAGNESIUM 40 MG/1
40 CAPSULE, DELAYED RELEASE ORAL DAILY
Qty: 30 CAPSULE | Refills: 4 | Status: SHIPPED | OUTPATIENT
Start: 2025-04-07

## 2025-04-18 ENCOUNTER — TELEPHONE (OUTPATIENT)
Dept: FAMILY MEDICINE CLINIC | Facility: CLINIC | Age: 70
End: 2025-04-18

## 2025-04-18 NOTE — TELEPHONE ENCOUNTER
Caller: Anjelica Portillo    Relationship to patient: Self    Best call back number:  Telephone Information:   Mobile [phone]         Patient is needing: PATIENT CALLING TO REQUEST OCTOBER LAB RESULTS BE MAILED TO HER.

## 2025-04-21 ENCOUNTER — OFFICE VISIT (OUTPATIENT)
Dept: FAMILY MEDICINE CLINIC | Facility: CLINIC | Age: 70
End: 2025-04-21
Payer: COMMERCIAL

## 2025-04-21 ENCOUNTER — LAB (OUTPATIENT)
Dept: FAMILY MEDICINE CLINIC | Facility: CLINIC | Age: 70
End: 2025-04-21
Payer: COMMERCIAL

## 2025-04-21 VITALS
SYSTOLIC BLOOD PRESSURE: 131 MMHG | HEART RATE: 68 BPM | WEIGHT: 171.6 LBS | TEMPERATURE: 97.9 F | OXYGEN SATURATION: 98 % | BODY MASS INDEX: 30.41 KG/M2 | HEIGHT: 63 IN | DIASTOLIC BLOOD PRESSURE: 82 MMHG

## 2025-04-21 DIAGNOSIS — E66.3 OVERWEIGHT (BMI 25.0-29.9): ICD-10-CM

## 2025-04-21 DIAGNOSIS — Z23 NEED FOR SHINGLES VACCINE: ICD-10-CM

## 2025-04-21 DIAGNOSIS — Z00.00 WELLNESS EXAMINATION: Primary | ICD-10-CM

## 2025-04-21 DIAGNOSIS — Z78.0 POSTMENOPAUSAL: ICD-10-CM

## 2025-04-21 LAB
ALBUMIN SERPL-MCNC: 4 G/DL (ref 3.5–5.2)
ALBUMIN/GLOB SERPL: 1.5 G/DL
ALP SERPL-CCNC: 115 U/L (ref 39–117)
ALT SERPL W P-5'-P-CCNC: 17 U/L (ref 1–33)
ANION GAP SERPL CALCULATED.3IONS-SCNC: 11.6 MMOL/L (ref 5–15)
AST SERPL-CCNC: 24 U/L (ref 1–32)
BILIRUB SERPL-MCNC: 0.4 MG/DL (ref 0–1.2)
BUN SERPL-MCNC: 19 MG/DL (ref 8–23)
BUN/CREAT SERPL: 18.8 (ref 7–25)
CALCIUM SPEC-SCNC: 8.9 MG/DL (ref 8.6–10.5)
CHLORIDE SERPL-SCNC: 105 MMOL/L (ref 98–107)
CHOLEST SERPL-MCNC: 223 MG/DL (ref 0–200)
CO2 SERPL-SCNC: 24.4 MMOL/L (ref 22–29)
CREAT SERPL-MCNC: 1.01 MG/DL (ref 0.57–1)
EGFRCR SERPLBLD CKD-EPI 2021: 60.8 ML/MIN/1.73
GLOBULIN UR ELPH-MCNC: 2.7 GM/DL
GLUCOSE SERPL-MCNC: 99 MG/DL (ref 65–99)
HBA1C MFR BLD: 5.7 % (ref 4.8–5.6)
HDLC SERPL-MCNC: 68 MG/DL (ref 40–60)
LDLC SERPL CALC-MCNC: 132 MG/DL (ref 0–100)
LDLC/HDLC SERPL: 1.89 {RATIO}
POTASSIUM SERPL-SCNC: 4.1 MMOL/L (ref 3.5–5.2)
PROT SERPL-MCNC: 6.7 G/DL (ref 6–8.5)
SODIUM SERPL-SCNC: 141 MMOL/L (ref 136–145)
TRIGL SERPL-MCNC: 134 MG/DL (ref 0–150)
VLDLC SERPL-MCNC: 23 MG/DL (ref 5–40)

## 2025-04-21 PROCEDURE — 36415 COLL VENOUS BLD VENIPUNCTURE: CPT | Performed by: FAMILY MEDICINE

## 2025-04-21 PROCEDURE — 80061 LIPID PANEL: CPT | Performed by: FAMILY MEDICINE

## 2025-04-21 PROCEDURE — 99396 PREV VISIT EST AGE 40-64: CPT | Performed by: FAMILY MEDICINE

## 2025-04-21 PROCEDURE — 90471 IMMUNIZATION ADMIN: CPT | Performed by: FAMILY MEDICINE

## 2025-04-21 PROCEDURE — 83036 HEMOGLOBIN GLYCOSYLATED A1C: CPT | Performed by: FAMILY MEDICINE

## 2025-04-21 PROCEDURE — 90750 HZV VACC RECOMBINANT IM: CPT | Performed by: FAMILY MEDICINE

## 2025-04-21 PROCEDURE — 80053 COMPREHEN METABOLIC PANEL: CPT | Performed by: FAMILY MEDICINE

## 2025-04-21 RX ORDER — PHENTERMINE HYDROCHLORIDE 37.5 MG/1
37.5 CAPSULE ORAL EVERY MORNING
Qty: 90 CAPSULE | Refills: 0 | Status: SHIPPED | OUTPATIENT
Start: 2025-04-21

## 2025-04-21 NOTE — PROGRESS NOTES
Injection  Injection performed in LEFT DELTOID by Portillo Carpenter MA. Patient tolerated the procedure well without complications.    Patient Supplied: NO    SHINGRIX   NDC: 85634-581-86  LOT: [id number]  EXP: 03/30/27    SHINGRIX RECONSTITUTION  NDC: 97876-524-95  LOT: [id number]  EXP: 3/30/2027    04/21/25   Portillo Carpenter MA

## 2025-04-21 NOTE — PROGRESS NOTES
"Shefali Portillo is a 69 y.o. female and is here for a comprehensive physical exam. The patient reports no problems.    Do you take any herbs or supplements that were not prescribed by a doctor? no  Are you taking calcium supplements? no  Are you taking aspirin daily? no    The following portions of the patient's history were reviewed and updated as appropriate: allergies, current medications, past family history, past medical history, past social history, past surgical history, and problem list.    Review of Systems  Do you have pain that bothers you in your daily life? not asked  A comprehensive review of systems was negative.    Objective   /82 (BP Location: Left arm, Patient Position: Sitting, Cuff Size: Adult)   Pulse 68   Temp 97.9 °F (36.6 °C) (Temporal)   Ht 160 cm (63\")   Wt 77.8 kg (171 lb 9.6 oz)   SpO2 98%   BMI 30.40 kg/m²   General appearance: alert, appears stated age, and cooperative  Head: Normocephalic, without obvious abnormality, atraumatic  Neck: no adenopathy, no JVD, supple, symmetrical, trachea midline, and thyroid not enlarged, symmetric, no tenderness/mass/nodules  Lungs: clear to auscultation bilaterally  Heart: regular rate and rhythm, S1, S2 normal, no murmur, click, rub or gallop  Abdomen: soft, non-tender; bowel sounds normal; no masses,  no organomegaly  Extremities: extremities normal, atraumatic, no cyanosis or edema  Skin: Skin color, texture, turgor normal. No rashes or lesions  Lymph nodes: Cervical, supraclavicular, and axillary nodes normal.     No visits with results within 1 Week(s) from this visit.   Latest known visit with results is:   Lab on 10/30/2024   Component Date Value Ref Range Status    Hepatitis B Surface Ag 10/30/2024 Non-Reactive  Non-Reactive Final    Hep A IgM 10/30/2024 Non-Reactive  Non-Reactive Final    Hep B C IgM 10/30/2024 Non-Reactive  Non-Reactive Final    Hepatitis C Ab 10/30/2024 Non-Reactive  Non-Reactive Final "       Assessment & Plan   Healthy female exam.   Diagnoses and all orders for this visit:    1. Wellness examination (Primary)  -     Comprehensive Metabolic Panel  -     Hemoglobin A1c  -     Lipid Panel    2. Postmenopausal  -     DEXA Bone Density Axial; Future    3. Overweight (BMI 25.0-29.9)  -     phentermine 37.5 MG capsule; Take 1 capsule by mouth Every Morning.  Dispense: 90 capsule; Refill: 0      1. Well exam.   2. Patient Counseling:  --Nutrition: Stressed importance of moderation in sodium/caffeine intake, saturated fat and cholesterol, caloric balance, sufficient intake of fresh fruits, vegetables, fiber, calcium, iron  --Exercise: Stressed the importance of regular exercise.    -Dental health: Discussed importance of regular tooth brushing, flossing, and dental visits.  --Immunizations reviewed.  --Discussed benefits of screening colonoscopy.    3. Discussed the patient's BMI with her.  The BMI is above average; BMI management plan is completed  4. Follow up in one year

## 2025-04-23 RX ORDER — ROSUVASTATIN CALCIUM 10 MG/1
10 TABLET, COATED ORAL DAILY
Qty: 90 TABLET | Refills: 3 | Status: SHIPPED | OUTPATIENT
Start: 2025-04-23

## 2025-05-01 RX ORDER — ESTRADIOL 1 MG/1
1 TABLET ORAL DAILY
Qty: 30 TABLET | Refills: 2 | Status: SHIPPED | OUTPATIENT
Start: 2025-05-01

## 2025-05-05 RX ORDER — LISINOPRIL 10 MG/1
10 TABLET ORAL DAILY
Qty: 30 TABLET | Refills: 2 | Status: SHIPPED | OUTPATIENT
Start: 2025-05-05

## 2025-05-14 ENCOUNTER — TELEPHONE (OUTPATIENT)
Dept: FAMILY MEDICINE CLINIC | Facility: CLINIC | Age: 70
End: 2025-05-14

## 2025-05-14 NOTE — TELEPHONE ENCOUNTER
Caller: Lindsey Anjelica S    Relationship: Self    Best call back number: [phone]    What form or medical record are you requesting: COPY OF MOST RECENT LAB RESULTS.     How would you like to receive the form or medical records (pick-up, mail, fax): MAIL

## 2025-07-25 ENCOUNTER — CLINICAL SUPPORT (OUTPATIENT)
Dept: FAMILY MEDICINE CLINIC | Facility: CLINIC | Age: 70
End: 2025-07-25
Payer: COMMERCIAL

## 2025-07-25 DIAGNOSIS — Z23 NEED FOR SHINGLES VACCINE: Primary | ICD-10-CM

## 2025-07-25 NOTE — PROGRESS NOTES
Injection  Injection performed in LEFT DELTOID by Portillo Carpenter MA. Patient tolerated the procedure well without complications.    Patient Supplied: NO    [facility]   NDC: 79248-231-45  LOT: [id number]  EXP: 4/4/2027    SHINGRIX RECONSTITUTION  NDC: 42400-050-83  LOT: [id number]  EXP: 4/4/2027    Portillo Carpenter MA  07/25/25, 15:34 EDT

## 2025-08-04 RX ORDER — ESTRADIOL 1 MG/1
1 TABLET ORAL DAILY
Qty: 30 TABLET | Refills: 2 | Status: SHIPPED | OUTPATIENT
Start: 2025-08-04

## 2025-08-26 RX ORDER — LISINOPRIL 10 MG/1
10 TABLET ORAL DAILY
Qty: 30 TABLET | Refills: 2 | Status: SHIPPED | OUTPATIENT
Start: 2025-08-26